# Patient Record
Sex: FEMALE | Race: WHITE | NOT HISPANIC OR LATINO | Employment: OTHER | URBAN - METROPOLITAN AREA
[De-identification: names, ages, dates, MRNs, and addresses within clinical notes are randomized per-mention and may not be internally consistent; named-entity substitution may affect disease eponyms.]

---

## 2017-04-04 ENCOUNTER — COMMUNICATION - HEALTHEAST (OUTPATIENT)
Dept: FAMILY MEDICINE | Facility: CLINIC | Age: 62
End: 2017-04-04

## 2017-04-04 DIAGNOSIS — G47.00 INSOMNIA: ICD-10-CM

## 2017-04-04 DIAGNOSIS — G43.909 MIGRAINE: ICD-10-CM

## 2017-04-06 ENCOUNTER — AMBULATORY - HEALTHEAST (OUTPATIENT)
Dept: FAMILY MEDICINE | Facility: CLINIC | Age: 62
End: 2017-04-06

## 2017-04-06 DIAGNOSIS — G47.00 INSOMNIA: ICD-10-CM

## 2017-04-22 ENCOUNTER — COMMUNICATION - HEALTHEAST (OUTPATIENT)
Dept: FAMILY MEDICINE | Facility: CLINIC | Age: 62
End: 2017-04-22

## 2017-04-22 DIAGNOSIS — G47.00 INSOMNIA: ICD-10-CM

## 2017-06-07 ENCOUNTER — HOSPITAL ENCOUNTER (OUTPATIENT)
Dept: MAMMOGRAPHY | Facility: HOSPITAL | Age: 62
Discharge: HOME | End: 2017-06-07
Attending: FAMILY MEDICINE

## 2017-06-07 DIAGNOSIS — Z12.31 VISIT FOR SCREENING MAMMOGRAM: ICD-10-CM

## 2017-06-29 ENCOUNTER — COMMUNICATION - HEALTHEAST (OUTPATIENT)
Dept: FAMILY MEDICINE | Facility: CLINIC | Age: 62
End: 2017-06-29

## 2017-06-30 ENCOUNTER — AMBULATORY - HEALTHEAST (OUTPATIENT)
Dept: FAMILY MEDICINE | Facility: CLINIC | Age: 62
End: 2017-06-30

## 2017-07-06 ENCOUNTER — COMMUNICATION - HEALTHEAST (OUTPATIENT)
Dept: SCHEDULING | Facility: CLINIC | Age: 62
End: 2017-07-06

## 2017-07-13 ENCOUNTER — AMBULATORY - HEALTHEAST (OUTPATIENT)
Dept: FAMILY MEDICINE | Facility: CLINIC | Age: 62
End: 2017-07-13

## 2017-07-17 ENCOUNTER — OFFICE VISIT - HEALTHEAST (OUTPATIENT)
Dept: FAMILY MEDICINE | Facility: CLINIC | Age: 62
End: 2017-07-17

## 2017-07-17 DIAGNOSIS — R07.9 CHEST PAIN: ICD-10-CM

## 2017-07-17 DIAGNOSIS — Z71.84 TRAVEL ADVICE ENCOUNTER: ICD-10-CM

## 2017-07-17 DIAGNOSIS — Z12.4 SCREENING FOR CERVICAL CANCER: ICD-10-CM

## 2017-07-17 DIAGNOSIS — R05.9 COUGH: ICD-10-CM

## 2017-07-17 ASSESSMENT — MIFFLIN-ST. JEOR: SCORE: 1392.96

## 2017-07-20 ENCOUNTER — COMMUNICATION - HEALTHEAST (OUTPATIENT)
Dept: FAMILY MEDICINE | Facility: CLINIC | Age: 62
End: 2017-07-20

## 2017-07-20 LAB
HPV INTERPRETATION - HISTORICAL: NORMAL
HPV INTERPRETER - HISTORICAL: NORMAL

## 2017-09-21 ENCOUNTER — COMMUNICATION - HEALTHEAST (OUTPATIENT)
Dept: FAMILY MEDICINE | Facility: CLINIC | Age: 62
End: 2017-09-21

## 2017-12-03 ENCOUNTER — COMMUNICATION - HEALTHEAST (OUTPATIENT)
Dept: FAMILY MEDICINE | Facility: CLINIC | Age: 62
End: 2017-12-03

## 2017-12-04 ENCOUNTER — COMMUNICATION - HEALTHEAST (OUTPATIENT)
Dept: FAMILY MEDICINE | Facility: CLINIC | Age: 62
End: 2017-12-04

## 2017-12-06 ENCOUNTER — COMMUNICATION - HEALTHEAST (OUTPATIENT)
Dept: FAMILY MEDICINE | Facility: CLINIC | Age: 62
End: 2017-12-06

## 2018-03-02 ENCOUNTER — COMMUNICATION - HEALTHEAST (OUTPATIENT)
Dept: SCHEDULING | Facility: CLINIC | Age: 63
End: 2018-03-02

## 2018-03-03 ENCOUNTER — COMMUNICATION - HEALTHEAST (OUTPATIENT)
Dept: FAMILY MEDICINE | Facility: CLINIC | Age: 63
End: 2018-03-03

## 2018-03-03 DIAGNOSIS — G43.909 MIGRAINE HEADACHE: ICD-10-CM

## 2018-06-27 ENCOUNTER — COMMUNICATION - HEALTHEAST (OUTPATIENT)
Dept: FAMILY MEDICINE | Facility: CLINIC | Age: 63
End: 2018-06-27

## 2018-06-27 DIAGNOSIS — G47.00 INSOMNIA: ICD-10-CM

## 2018-08-09 ENCOUNTER — COMMUNICATION - HEALTHEAST (OUTPATIENT)
Dept: FAMILY MEDICINE | Facility: CLINIC | Age: 63
End: 2018-08-09

## 2018-08-09 DIAGNOSIS — G43.909 MIGRAINE HEADACHE: ICD-10-CM

## 2018-08-17 ENCOUNTER — HOSPITAL ENCOUNTER (OUTPATIENT)
Dept: MAMMOGRAPHY | Facility: CLINIC | Age: 63
Discharge: HOME | End: 2018-08-17
Attending: FAMILY MEDICINE

## 2018-08-17 DIAGNOSIS — Z12.31 VISIT FOR SCREENING MAMMOGRAM: ICD-10-CM

## 2018-09-17 ENCOUNTER — OFFICE VISIT - HEALTHEAST (OUTPATIENT)
Dept: FAMILY MEDICINE | Facility: CLINIC | Age: 63
End: 2018-09-17

## 2018-09-17 DIAGNOSIS — G47.00 INSOMNIA: ICD-10-CM

## 2018-09-17 DIAGNOSIS — G43.909 MIGRAINE HEADACHE: ICD-10-CM

## 2018-09-17 DIAGNOSIS — M75.42 IMPINGEMENT SYNDROME, SHOULDER, LEFT: ICD-10-CM

## 2018-09-17 ASSESSMENT — MIFFLIN-ST. JEOR: SCORE: 1324.92

## 2018-11-30 ENCOUNTER — COMMUNICATION - HEALTHEAST (OUTPATIENT)
Dept: FAMILY MEDICINE | Facility: CLINIC | Age: 63
End: 2018-11-30

## 2018-12-04 ENCOUNTER — AMBULATORY - HEALTHEAST (OUTPATIENT)
Dept: NURSING | Facility: CLINIC | Age: 63
End: 2018-12-04

## 2019-02-05 ENCOUNTER — AMBULATORY - HEALTHEAST (OUTPATIENT)
Dept: NURSING | Facility: CLINIC | Age: 64
End: 2019-02-05

## 2019-02-05 ENCOUNTER — COMMUNICATION - HEALTHEAST (OUTPATIENT)
Dept: INTERNAL MEDICINE | Facility: CLINIC | Age: 64
End: 2019-02-05

## 2019-02-05 DIAGNOSIS — Z23 NEED FOR SHINGLES VACCINE: ICD-10-CM

## 2019-03-20 ENCOUNTER — OFFICE VISIT - HEALTHEAST (OUTPATIENT)
Dept: FAMILY MEDICINE | Facility: CLINIC | Age: 64
End: 2019-03-20

## 2019-03-20 DIAGNOSIS — M75.42 IMPINGEMENT SYNDROME, SHOULDER, LEFT: ICD-10-CM

## 2019-03-20 DIAGNOSIS — R41.3 MEMORY CHANGES: ICD-10-CM

## 2019-03-20 DIAGNOSIS — R19.5 LOOSE STOOLS: ICD-10-CM

## 2019-03-20 DIAGNOSIS — G47.00 INSOMNIA, UNSPECIFIED TYPE: ICD-10-CM

## 2019-03-20 DIAGNOSIS — G43.909 MIGRAINE WITHOUT STATUS MIGRAINOSUS, NOT INTRACTABLE, UNSPECIFIED MIGRAINE TYPE: ICD-10-CM

## 2019-03-20 ASSESSMENT — MIFFLIN-ST. JEOR: SCORE: 1374.82

## 2019-03-31 ENCOUNTER — COMMUNICATION - HEALTHEAST (OUTPATIENT)
Dept: FAMILY MEDICINE | Facility: CLINIC | Age: 64
End: 2019-03-31

## 2019-03-31 DIAGNOSIS — M25.519 SHOULDER PAIN, UNSPECIFIED CHRONICITY, UNSPECIFIED LATERALITY: ICD-10-CM

## 2019-04-01 ENCOUNTER — COMMUNICATION - HEALTHEAST (OUTPATIENT)
Dept: FAMILY MEDICINE | Facility: CLINIC | Age: 64
End: 2019-04-01

## 2019-04-08 ENCOUNTER — COMMUNICATION - HEALTHEAST (OUTPATIENT)
Dept: FAMILY MEDICINE | Facility: CLINIC | Age: 64
End: 2019-04-08

## 2019-04-15 ENCOUNTER — COMMUNICATION - HEALTHEAST (OUTPATIENT)
Dept: FAMILY MEDICINE | Facility: CLINIC | Age: 64
End: 2019-04-15

## 2019-04-24 ENCOUNTER — COMMUNICATION - HEALTHEAST (OUTPATIENT)
Dept: FAMILY MEDICINE | Facility: CLINIC | Age: 64
End: 2019-04-24

## 2019-05-28 ENCOUNTER — RECORDS - HEALTHEAST (OUTPATIENT)
Dept: GENERAL RADIOLOGY | Facility: CLINIC | Age: 64
End: 2019-05-28

## 2019-05-28 ENCOUNTER — OFFICE VISIT - HEALTHEAST (OUTPATIENT)
Dept: FAMILY MEDICINE | Facility: CLINIC | Age: 64
End: 2019-05-28

## 2019-05-28 DIAGNOSIS — M25.551 HIP PAIN, RIGHT: ICD-10-CM

## 2019-05-28 DIAGNOSIS — M25.551 PAIN IN RIGHT HIP: ICD-10-CM

## 2019-05-28 ASSESSMENT — MIFFLIN-ST. JEOR: SCORE: 1400.22

## 2019-06-14 ENCOUNTER — COMMUNICATION - HEALTHEAST (OUTPATIENT)
Dept: FAMILY MEDICINE | Facility: CLINIC | Age: 64
End: 2019-06-14

## 2019-06-14 DIAGNOSIS — G43.909 MIGRAINE HEADACHE: ICD-10-CM

## 2019-09-04 ENCOUNTER — COMMUNICATION - HEALTHEAST (OUTPATIENT)
Dept: SCHEDULING | Facility: CLINIC | Age: 64
End: 2019-09-04

## 2019-09-04 ENCOUNTER — OFFICE VISIT - HEALTHEAST (OUTPATIENT)
Dept: FAMILY MEDICINE | Facility: CLINIC | Age: 64
End: 2019-09-04
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
## 2019-09-14 ENCOUNTER — OFFICE VISIT - HEALTHEAST (OUTPATIENT)
Dept: FAMILY MEDICINE | Facility: CLINIC | Age: 64
End: 2019-09-14

## 2019-09-14 DIAGNOSIS — L29.9 ITCHING: ICD-10-CM

## 2019-09-14 DIAGNOSIS — L03.115 CELLULITIS OF RIGHT LOWER LEG: ICD-10-CM

## 2019-09-30 ENCOUNTER — COMMUNICATION - HEALTHEAST (OUTPATIENT)
Dept: SCHEDULING | Facility: CLINIC | Age: 64
End: 2019-09-30

## 2019-09-30 ENCOUNTER — OFFICE VISIT - HEALTHEAST (OUTPATIENT)
Dept: FAMILY MEDICINE | Facility: CLINIC | Age: 64
End: 2019-09-30

## 2019-09-30 DIAGNOSIS — L50.9 HIVES: ICD-10-CM

## 2019-09-30 ASSESSMENT — MIFFLIN-ST. JEOR: SCORE: 1374.82

## 2019-10-01 ENCOUNTER — COMMUNICATION - HEALTHEAST (OUTPATIENT)
Dept: SCHEDULING | Facility: CLINIC | Age: 64
End: 2019-10-01

## 2019-10-01 ENCOUNTER — RECORDS - HEALTHEAST (OUTPATIENT)
Dept: ADMINISTRATIVE | Facility: OTHER | Age: 64
End: 2019-10-01

## 2019-10-01 DIAGNOSIS — L50.9 HIVES: ICD-10-CM

## 2019-11-17 ENCOUNTER — COMMUNICATION - HEALTHEAST (OUTPATIENT)
Dept: FAMILY MEDICINE | Facility: CLINIC | Age: 64
End: 2019-11-17

## 2019-11-17 DIAGNOSIS — G43.909 MIGRAINE HEADACHE: ICD-10-CM

## 2019-11-18 ENCOUNTER — COMMUNICATION - HEALTHEAST (OUTPATIENT)
Dept: FAMILY MEDICINE | Facility: CLINIC | Age: 64
End: 2019-11-18

## 2020-01-20 ENCOUNTER — COMMUNICATION - HEALTHEAST (OUTPATIENT)
Dept: SCHEDULING | Facility: CLINIC | Age: 65
End: 2020-01-20

## 2020-03-04 ENCOUNTER — OFFICE VISIT - HEALTHEAST (OUTPATIENT)
Dept: FAMILY MEDICINE | Facility: CLINIC | Age: 65
End: 2020-03-04

## 2020-03-04 DIAGNOSIS — M16.11 PRIMARY OSTEOARTHRITIS OF RIGHT HIP: ICD-10-CM

## 2020-03-04 DIAGNOSIS — G47.00 INSOMNIA, UNSPECIFIED TYPE: ICD-10-CM

## 2020-03-04 DIAGNOSIS — Z01.818 PREOP GENERAL PHYSICAL EXAM: ICD-10-CM

## 2020-03-04 DIAGNOSIS — G43.909 MIGRAINE WITHOUT STATUS MIGRAINOSUS, NOT INTRACTABLE, UNSPECIFIED MIGRAINE TYPE: ICD-10-CM

## 2020-03-04 LAB
ERYTHROCYTE [DISTWIDTH] IN BLOOD BY AUTOMATED COUNT: 11.8 % (ref 11–14.5)
HCT VFR BLD AUTO: 41.7 % (ref 35–47)
HGB BLD-MCNC: 14 G/DL (ref 12–16)
MCH RBC QN AUTO: 31.3 PG (ref 27–34)
MCHC RBC AUTO-ENTMCNC: 33.6 G/DL (ref 32–36)
MCV RBC AUTO: 93 FL (ref 80–100)
PLATELET # BLD AUTO: 224 THOU/UL (ref 140–440)
PMV BLD AUTO: 8.6 FL (ref 7–10)
RBC # BLD AUTO: 4.48 MILL/UL (ref 3.8–5.4)
WBC: 6.8 THOU/UL (ref 4–11)

## 2020-03-04 RX ORDER — MAGNESIUM 30 MG
30 TABLET ORAL 2 TIMES DAILY
Status: SHIPPED | COMMUNITY
Start: 2020-03-04

## 2020-03-04 ASSESSMENT — MIFFLIN-ST. JEOR: SCORE: 1406.57

## 2020-03-06 LAB
ATRIAL RATE - MUSE: 74 BPM
DIASTOLIC BLOOD PRESSURE - MUSE: NORMAL
INTERPRETATION ECG - MUSE: NORMAL
P AXIS - MUSE: 65 DEGREES
PR INTERVAL - MUSE: 144 MS
QRS DURATION - MUSE: 88 MS
QT - MUSE: 410 MS
QTC - MUSE: 455 MS
R AXIS - MUSE: -1 DEGREES
SYSTOLIC BLOOD PRESSURE - MUSE: NORMAL
T AXIS - MUSE: 36 DEGREES
VENTRICULAR RATE- MUSE: 74 BPM

## 2020-03-10 ENCOUNTER — OFFICE VISIT - HEALTHEAST (OUTPATIENT)
Dept: FAMILY MEDICINE | Facility: CLINIC | Age: 65
End: 2020-03-10

## 2020-03-10 DIAGNOSIS — L03.011 PARONYCHIA OF FINGER OF RIGHT HAND: ICD-10-CM

## 2020-03-12 ENCOUNTER — COMMUNICATION - HEALTHEAST (OUTPATIENT)
Dept: FAMILY MEDICINE | Facility: CLINIC | Age: 65
End: 2020-03-12

## 2020-03-12 ENCOUNTER — COMMUNICATION - HEALTHEAST (OUTPATIENT)
Dept: SCHEDULING | Facility: CLINIC | Age: 65
End: 2020-03-12

## 2020-03-18 ENCOUNTER — COMMUNICATION - HEALTHEAST (OUTPATIENT)
Dept: FAMILY MEDICINE | Facility: CLINIC | Age: 65
End: 2020-03-18

## 2020-03-23 ENCOUNTER — RECORDS - HEALTHEAST (OUTPATIENT)
Dept: ADMINISTRATIVE | Facility: OTHER | Age: 65
End: 2020-03-23

## 2020-03-27 ENCOUNTER — COMMUNICATION - HEALTHEAST (OUTPATIENT)
Dept: FAMILY MEDICINE | Facility: CLINIC | Age: 65
End: 2020-03-27

## 2020-03-30 ENCOUNTER — COMMUNICATION - HEALTHEAST (OUTPATIENT)
Dept: FAMILY MEDICINE | Facility: CLINIC | Age: 65
End: 2020-03-30

## 2020-04-08 ENCOUNTER — COMMUNICATION - HEALTHEAST (OUTPATIENT)
Dept: FAMILY MEDICINE | Facility: CLINIC | Age: 65
End: 2020-04-08

## 2020-05-18 ENCOUNTER — RECORDS - HEALTHEAST (OUTPATIENT)
Dept: ADMINISTRATIVE | Facility: OTHER | Age: 65
End: 2020-05-18

## 2020-06-11 ENCOUNTER — RECORDS - HEALTHEAST (OUTPATIENT)
Dept: ADMINISTRATIVE | Facility: OTHER | Age: 65
End: 2020-06-11

## 2020-07-23 ENCOUNTER — COMMUNICATION - HEALTHEAST (OUTPATIENT)
Dept: FAMILY MEDICINE | Facility: CLINIC | Age: 65
End: 2020-07-23

## 2020-07-24 ENCOUNTER — OFFICE VISIT - HEALTHEAST (OUTPATIENT)
Dept: FAMILY MEDICINE | Facility: CLINIC | Age: 65
End: 2020-07-24

## 2020-07-24 ENCOUNTER — COMMUNICATION - HEALTHEAST (OUTPATIENT)
Dept: SCHEDULING | Facility: CLINIC | Age: 65
End: 2020-07-24

## 2020-07-24 DIAGNOSIS — N30.00 ACUTE CYSTITIS WITHOUT HEMATURIA: ICD-10-CM

## 2020-07-24 DIAGNOSIS — R39.15 URINARY URGENCY: ICD-10-CM

## 2020-07-24 DIAGNOSIS — R10.32 ABDOMINAL PAIN, LEFT LOWER QUADRANT: ICD-10-CM

## 2020-07-24 LAB
ALBUMIN SERPL-MCNC: 3.5 G/DL (ref 3.5–5)
ALBUMIN UR-MCNC: NEGATIVE MG/DL
ALP SERPL-CCNC: 85 U/L (ref 45–120)
ALT SERPL W P-5'-P-CCNC: 25 U/L (ref 0–45)
ANION GAP SERPL CALCULATED.3IONS-SCNC: 7 MMOL/L (ref 5–18)
APPEARANCE UR: CLEAR
AST SERPL W P-5'-P-CCNC: 20 U/L (ref 0–40)
BACTERIA #/AREA URNS HPF: ABNORMAL HPF
BILIRUB SERPL-MCNC: 0.3 MG/DL (ref 0–1)
BILIRUB UR QL STRIP: NEGATIVE
BUN SERPL-MCNC: 16 MG/DL (ref 8–22)
CALCIUM SERPL-MCNC: 9.2 MG/DL (ref 8.5–10.5)
CHLORIDE BLD-SCNC: 107 MMOL/L (ref 98–107)
CO2 SERPL-SCNC: 28 MMOL/L (ref 22–31)
COLOR UR AUTO: YELLOW
CREAT SERPL-MCNC: 0.77 MG/DL (ref 0.6–1.1)
ERYTHROCYTE [DISTWIDTH] IN BLOOD BY AUTOMATED COUNT: 12.7 % (ref 11–14.5)
GFR SERPL CREATININE-BSD FRML MDRD: >60 ML/MIN/1.73M2
GLUCOSE BLD-MCNC: 84 MG/DL (ref 70–125)
GLUCOSE UR STRIP-MCNC: NEGATIVE MG/DL
HCT VFR BLD AUTO: 43.5 % (ref 35–47)
HGB BLD-MCNC: 13.6 G/DL (ref 12–16)
HGB UR QL STRIP: NEGATIVE
KETONES UR STRIP-MCNC: NEGATIVE MG/DL
LEUKOCYTE ESTERASE UR QL STRIP: ABNORMAL
MCH RBC QN AUTO: 30.4 PG (ref 27–34)
MCHC RBC AUTO-ENTMCNC: 31.3 G/DL (ref 32–36)
MCV RBC AUTO: 97 FL (ref 80–100)
MUCOUS THREADS #/AREA URNS LPF: ABNORMAL LPF
NITRATE UR QL: POSITIVE
PH UR STRIP: 5.5 [PH] (ref 5–8)
PLATELET # BLD AUTO: 194 THOU/UL (ref 140–440)
PMV BLD AUTO: 12.4 FL (ref 8.5–12.5)
POTASSIUM BLD-SCNC: 4.9 MMOL/L (ref 3.5–5)
PROT SERPL-MCNC: 5.9 G/DL (ref 6–8)
RBC # BLD AUTO: 4.48 MILL/UL (ref 3.8–5.4)
RBC #/AREA URNS AUTO: ABNORMAL HPF
SODIUM SERPL-SCNC: 142 MMOL/L (ref 136–145)
SP GR UR STRIP: 1.02 (ref 1–1.03)
SQUAMOUS #/AREA URNS AUTO: ABNORMAL LPF
UROBILINOGEN UR STRIP-ACNC: ABNORMAL
WBC #/AREA URNS AUTO: ABNORMAL HPF
WBC: 5.9 THOU/UL (ref 4–11)

## 2020-07-25 ENCOUNTER — COMMUNICATION - HEALTHEAST (OUTPATIENT)
Dept: FAMILY MEDICINE | Facility: CLINIC | Age: 65
End: 2020-07-25

## 2020-07-27 LAB — BACTERIA SPEC CULT: ABNORMAL

## 2020-09-06 ENCOUNTER — COMMUNICATION - HEALTHEAST (OUTPATIENT)
Dept: FAMILY MEDICINE | Facility: CLINIC | Age: 65
End: 2020-09-06

## 2020-09-06 DIAGNOSIS — N30.00 ACUTE CYSTITIS WITHOUT HEMATURIA: ICD-10-CM

## 2020-09-08 ENCOUNTER — AMBULATORY - HEALTHEAST (OUTPATIENT)
Dept: LAB | Facility: CLINIC | Age: 65
End: 2020-09-08

## 2020-09-08 ENCOUNTER — AMBULATORY - HEALTHEAST (OUTPATIENT)
Dept: FAMILY MEDICINE | Facility: CLINIC | Age: 65
End: 2020-09-08

## 2020-09-08 ENCOUNTER — COMMUNICATION - HEALTHEAST (OUTPATIENT)
Dept: FAMILY MEDICINE | Facility: CLINIC | Age: 65
End: 2020-09-08

## 2020-09-08 DIAGNOSIS — N30.00 ACUTE CYSTITIS WITHOUT HEMATURIA: ICD-10-CM

## 2020-09-08 LAB
ALBUMIN UR-MCNC: NEGATIVE MG/DL
APPEARANCE UR: ABNORMAL
BACTERIA #/AREA URNS HPF: ABNORMAL HPF
BILIRUB UR QL STRIP: NEGATIVE
COLOR UR AUTO: YELLOW
GLUCOSE UR STRIP-MCNC: NEGATIVE MG/DL
HGB UR QL STRIP: ABNORMAL
KETONES UR STRIP-MCNC: NEGATIVE MG/DL
LEUKOCYTE ESTERASE UR QL STRIP: ABNORMAL
MUCOUS THREADS #/AREA URNS LPF: ABNORMAL LPF
NITRATE UR QL: POSITIVE
PH UR STRIP: 5.5 [PH] (ref 5–8)
RBC #/AREA URNS AUTO: ABNORMAL HPF
SP GR UR STRIP: 1.01 (ref 1–1.03)
SQUAMOUS #/AREA URNS AUTO: ABNORMAL LPF
UROBILINOGEN UR STRIP-ACNC: ABNORMAL
WBC #/AREA URNS AUTO: ABNORMAL HPF
WBC CLUMPS #/AREA URNS HPF: PRESENT /[HPF]

## 2020-09-10 LAB — BACTERIA SPEC CULT: ABNORMAL

## 2020-11-27 ENCOUNTER — HOSPITAL ENCOUNTER (OUTPATIENT)
Dept: MAMMOGRAPHY | Facility: CLINIC | Age: 65
Discharge: HOME | End: 2020-11-27
Attending: FAMILY MEDICINE

## 2020-11-27 DIAGNOSIS — Z12.31 VISIT FOR SCREENING MAMMOGRAM: ICD-10-CM

## 2020-12-02 ENCOUNTER — COMMUNICATION - HEALTHEAST (OUTPATIENT)
Dept: FAMILY MEDICINE | Facility: CLINIC | Age: 65
End: 2020-12-02

## 2020-12-02 DIAGNOSIS — G43.909 MIGRAINE HEADACHE: ICD-10-CM

## 2021-01-02 ENCOUNTER — COMMUNICATION - HEALTHEAST (OUTPATIENT)
Dept: SCHEDULING | Facility: CLINIC | Age: 66
End: 2021-01-02

## 2021-01-04 ENCOUNTER — RECORDS - HEALTHEAST (OUTPATIENT)
Dept: GENERAL RADIOLOGY | Facility: CLINIC | Age: 66
End: 2021-01-04

## 2021-01-04 ENCOUNTER — OFFICE VISIT - HEALTHEAST (OUTPATIENT)
Dept: FAMILY MEDICINE | Facility: CLINIC | Age: 66
End: 2021-01-04

## 2021-01-04 DIAGNOSIS — M54.50 LOW BACK PAIN: ICD-10-CM

## 2021-01-04 DIAGNOSIS — M54.50 ACUTE MIDLINE LOW BACK PAIN WITHOUT SCIATICA: ICD-10-CM

## 2021-01-04 RX ORDER — CYCLOBENZAPRINE HCL 5 MG
5-10 TABLET ORAL 3 TIMES DAILY PRN
Qty: 30 TABLET | Refills: 0 | Status: SHIPPED | OUTPATIENT
Start: 2021-01-04 | End: 2022-04-05

## 2021-01-04 ASSESSMENT — MIFFLIN-ST. JEOR: SCORE: 1442.86

## 2021-02-24 ENCOUNTER — COMMUNICATION - HEALTHEAST (OUTPATIENT)
Dept: FAMILY MEDICINE | Facility: CLINIC | Age: 66
End: 2021-02-24

## 2021-03-01 ENCOUNTER — COMMUNICATION - HEALTHEAST (OUTPATIENT)
Dept: FAMILY MEDICINE | Facility: CLINIC | Age: 66
End: 2021-03-01

## 2021-03-02 ENCOUNTER — COMMUNICATION - HEALTHEAST (OUTPATIENT)
Dept: FAMILY MEDICINE | Facility: CLINIC | Age: 66
End: 2021-03-02

## 2021-03-03 ENCOUNTER — OFFICE VISIT - HEALTHEAST (OUTPATIENT)
Dept: FAMILY MEDICINE | Facility: CLINIC | Age: 66
End: 2021-03-03

## 2021-03-03 ENCOUNTER — COMMUNICATION - HEALTHEAST (OUTPATIENT)
Dept: FAMILY MEDICINE | Facility: CLINIC | Age: 66
End: 2021-03-03

## 2021-03-03 DIAGNOSIS — G43.909 MIGRAINE WITHOUT STATUS MIGRAINOSUS, NOT INTRACTABLE, UNSPECIFIED MIGRAINE TYPE: ICD-10-CM

## 2021-04-12 ENCOUNTER — COMMUNICATION - HEALTHEAST (OUTPATIENT)
Dept: FAMILY MEDICINE | Facility: CLINIC | Age: 66
End: 2021-04-12

## 2021-04-12 DIAGNOSIS — G43.909 MIGRAINE HEADACHE: ICD-10-CM

## 2021-04-12 RX ORDER — ZOLMITRIPTAN 5 MG/1
TABLET, FILM COATED ORAL
Qty: 12 TABLET | Refills: 5 | Status: SHIPPED | OUTPATIENT
Start: 2021-04-12 | End: 2021-12-21

## 2021-05-27 NOTE — TELEPHONE ENCOUNTER
Who is calling:  Myron barnes [facility]    Reason for Call:  Wanting referral faxed over to [phone]. Inform Myron that it states the order was faxed on 04.09.19 and he stated that they did not receive it and if it can be re- faxed. Please add Attn : Referrals on order when faxing.   Date of last appointment with primary care: 03.20.19  Okay to leave a detailed message: Yes

## 2021-05-27 NOTE — PROGRESS NOTES
Assessment/Plan:     1. Loose stools  No red symptoms identified.  We discussed options.  Because her symptoms are rather mild and started with it.  Of stress and dietary changes, will have her start by initiating a probiotic.  Encouraged her to add Metamucil as well, increase dietary fiber, reduced.  Sugars.  Notify with persistence, worsening, or onset of additional symptoms.    2. Impingement syndrome, shoulder, left  Reviewed nature of condition.  Encouraged use of NSAIDs regularly, ice, range of motion x-ray avoidance of excessive activity or weightbearing above shoulder height, and consideration of seeing orthopedic specialist versus physical therapy.  She is leaning toward her orthopedic specialist.  Information provided.  Notify with ongoing for additional difficulties.    3. Migraine without status migrainosus, not intractable, unspecified migraine type  She is doing well off amitriptyline, though this may be contributing to her recent worsening of sleep.  We will keep an eye on migraine pattern.    4. Insomnia, unspecified type  Encourage efforts at good sleep hygiene.  May need to consider resumption of amitriptyline.  Discussed melatonin for sleep cycle adjustment.  Encouraged consideration of a therapist due to recent stressors.  Notify with persistence or worsening.    5. Memory changes  No red flag symptoms.  Encouraged her to discuss this with her family and close friends to see if there are no recent changes as well.  She denies anxiety depression symptoms contributing significantly.  Encourage follow-up visit with ongoing concerns.      Patient Instructions   For your arm, begin either ibuprofen 600 mg 3 times daily with food or naproxen 500 mg twice daily with food, ice 3 times daily, and consider appointment with orthopedic surgery or physical therapy.    For your change in bowels, continue your probiotic, add Metamucil, and work to increase her intake of dietary fiber as well reducing dairy  and sugars.  Notify me if lack of improvement or worsening.    Consider restarting amitriptyline to help with your sleep.  You can take it as needed or on a regular basis.  Work to make her sleep environment more comfortable so that he can sleep more soundly.  Consider seeing a therapist to work through some of the anxiety that is creating distraction for you.       Return in about 3 months (around 6/20/2019) for Annual physical.      Subjective:      Kaitlynn Ramirez is a 63 y.o. female presented to clinic today with multiple concerns.  First she is noticing a change in bowel movements.  Left lower sed rate predictable bowel movements once daily.  She traveled in January, and since then has had stools are looser than usual occurring anywhere from 3-8 times per day.  No mucus or blood.  No fevers or chills.  No abdominal pain.  No dietary changes but notes that her diet in general has been very poor.  She has not tried any interventions.    Noting ongoing difficulties with left arm pain.  I saw her in September and diagnosed left shoulder impingement, recommended NSAIDs, ice, and initially she had some benefit but not resolved and now seems to be getting worse.  Is radiating into her upper arm and sometimes is disruptive of sleep causing her need to change position.  She had not pursue physical therapy.  She is no longer using ice to the NSAIDs.  No weakness.    Previously taking amitriptyline regularly at bedtime for primarily migraine prevention.  She discontinued this with her travel in January.  Notes her migraines have been better but then just recently her basement flooded, she had worsened sleep, and she is noting a bump in headaches and feeling a bit more lima.  States that she is not sleeping well which she attributes to sleeping in a different floor that is much more warm than her usual pattern due to recent clotting.  Using Advil PM as needed.    Concerns regarding memory and fear of Alzheimer's.   "No family history of Alzheimer's dementia.  Notes that she occasionally is forgetting words.  Sometimes will make \"silly mistakes\" at work.  She is not having difficulties with following recipes, finances, navigation, etc. and has not had any friends or family members mention concerns to her.  She has not asked them.    Current Outpatient Medications   Medication Sig Dispense Refill     calcium polycarbophil (FIBERCON) 625 mg tablet Take 1,250 mg by mouth daily.       cholecalciferol, vitamin D3, (VITAMIN D3) 1,000 unit capsule Take 1,000 Units by mouth daily.       LACTOBACILLUS ACIDOPHILUS (PROBIOTIC ORAL) Take 1 capsule by mouth daily.        multivitamin therapeutic (THERAGRAN) tablet Take 1 tablet by mouth daily.       omeprazole (PRILOSEC) 20 MG capsule Take 1 capsule (20 mg total) by mouth daily before breakfast. 30 capsule 0     ZOLMitriptan (ZOMIG) 5 MG tablet TAKE ONE-HALF (1/2) TO ONE FULL TABLET AS NEEDED FOR MIGRAINE. Maximum 2 doses in 24 hours. 12 tablet 6     zolpidem (AMBIEN) 5 MG tablet Take 5-10 mg by mouth at bedtime as needed for sleep.       No current facility-administered medications for this visit.        Past Medical History, Family History, and Social History reviewed.  Past Medical History:   Diagnosis Date     Migraine      Past Surgical History:   Procedure Laterality Date     FOOT SURGERY Left 1994     RETINAL DETACHMENT SURGERY Left 11/2015     Patient has no known allergies.  Family History   Problem Relation Age of Onset     Kidney cancer Mother      Basal cell carcinoma Mother      No Medical Problems Sister      Basal cell carcinoma Brother      No Medical Problems Brother      No Medical Problems Brother      Breast cancer Neg Hx      Colon cancer Neg Hx      Prostate cancer Neg Hx      Ovarian cancer Neg Hx      Social History     Socioeconomic History     Marital status:      Spouse name: Not on file     Number of children: Not on file     Years of education: Not on file " "    Highest education level: Not on file   Occupational History     Not on file   Social Needs     Financial resource strain: Not on file     Food insecurity:     Worry: Not on file     Inability: Not on file     Transportation needs:     Medical: Not on file     Non-medical: Not on file   Tobacco Use     Smoking status: Never Smoker     Smokeless tobacco: Never Used   Substance and Sexual Activity     Alcohol use: No     Comment: social and infrequent     Drug use: No     Sexual activity: Not on file   Lifestyle     Physical activity:     Days per week: Not on file     Minutes per session: Not on file     Stress: Not on file   Relationships     Social connections:     Talks on phone: Not on file     Gets together: Not on file     Attends [employer] service: Not on file     Active member of club or organization: Not on file     Attends meetings of clubs or organizations: Not on file     Relationship status: Not on file     Intimate partner violence:     Fear of current or ex partner: Not on file     Emotionally abused: Not on file     Physically abused: Not on file     Forced sexual activity: Not on file   Other Topics Concern     Not on file   Social History Narrative     Not on file         Review of systems is as stated in HPI, and the remainder of the 10 system review is otherwise negative.    Objective:     Vitals:    03/20/19 1547   BP: 112/78   Pulse: 74   Resp: 20   Temp: 98.3  F (36.8  C)   TempSrc: Oral   SpO2: 94%   Weight: 174 lb (78.9 kg)   Height: 5' 7.5\" (1.715 m)    Body mass index is 26.85 kg/m .    Alert female.  Anxious.  Mucous membranes moist.  Neck supple without lymphadenopathy or thyromegaly.  She has good range of motion of both shoulders.  Tenderness is elicited with resisted abduction and external rotation with good strength throughout.  No focal tenderness to palpation throughout her shoulder and neck.  Heart regular rate and rhythm.  Lungs clear.  Extremities without edema.  She has no " difficulty with our conversation today and her answers are appropriate.      This note has been dictated using voice recognition software. Any grammatical or context distortions are unintentional and inherent to the the software.

## 2021-05-28 NOTE — TELEPHONE ENCOUNTER
Patient stopped into clinic stating [facility] did not receive the referral, therefore she had to cancel her appointment for earlier today. I called [facility] and spoke to their managed care rep and informed her that we sent the referral to [facility] and gave her the authorization number. She said patient is ok to schedule. Gave authorization number to patient as well and informed her to call us if there are any further issues with getting an appointment at [facility]. Gave patient my direct phone number.

## 2021-05-28 NOTE — TELEPHONE ENCOUNTER
Who is calling:  Patient  Reason for Call: Patient calling from [facility] for PT appointment.  Pt needs copy of referral & authorization faxed  ASAP or she cannot be seen today. I faxed copy to [facility] @ [phone]   .    Date of last appointment with primary care:  3/20/19  Has the patient been recently seen:  Yes  Okay to leave a detailed message: Yes

## 2021-05-28 NOTE — TELEPHONE ENCOUNTER
Who is calling:  Patient   Reason for Call:  Patient needa prior authorization for referral with [facility].  Patient is asking if this can be completed if possible by Wednesday 4/24/19 as she has an am appointment at [facility].  Date of last appointment with primary care: NA  Okay to leave a detailed message: Yes [phone]

## 2021-05-29 NOTE — PROGRESS NOTES
Assessment/Plan:    1. Hip pain, right  We discussed potential etiologies of right hip pain including osteoarthritis.  Right pelvic and hip x-ray today is negative for fractures or dislocations.  Given worsening pain over the past 3 weeks, will place referral to orthopedic surgery for further evaluation and management.  I recommend use of extra strength Tylenol or ibuprofen for pain management.  We discussed alternating application of ice and heat for pain management as well.  Patient is advised to notify us with any new or worsening symptoms in the meantime.  - XR Pelvis W 2 Vw Hip Right; Future  - Ambulatory referral to Orthopedic Surgery    Subjective:    Kaitlynn Ramirez is a 63 year old female seen today for evaluation of right hip pain.  Patient has been experiencing pain in her right groin and hip on and off for the past 5 years or so.  Over the last couple weeks it is been notably worse.  Feels that with time it is worsening and persistent.  Patient recalls discussing her right hip pain at an office visit within the last couple of years.  She was told at that time that pain was most likely related to arthritis.  Patient is now worried because pain seems to be worse than it was at that time.  She denies any new injury to the hip.  Finds that pain worsens with certain weightbearing activities such as stairs, getting in and out of a car.  She denies any increased pain with straining.  No numbness or tingling in her right leg.  Range of motion is intact but she feels that it is more stiff than usual.  She has taken over-the-counter medications with just mild relief of symptoms. No low back pain, no shooting pain.  She has felt well otherwise overall.  Reports some arthritis in her left shoulder which she has been doing physical therapy for. Review of systems is as stated in HPI, and the remainder of the 10 system review is otherwise unremarkable.    Past Medical History, Family History, and Social History  "reviewed.    Past Surgical History:   Procedure Laterality Date     FOOT SURGERY Left 1994     RETINAL DETACHMENT SURGERY Left 11/2015        Family History   Problem Relation Age of Onset     Kidney cancer Mother      Basal cell carcinoma Mother      No Medical Problems Sister      Basal cell carcinoma Brother      No Medical Problems Brother      No Medical Problems Brother      Breast cancer Neg Hx      Colon cancer Neg Hx      Prostate cancer Neg Hx      Ovarian cancer Neg Hx         Past Medical History:   Diagnosis Date     Migraine         Social History     Tobacco Use     Smoking status: Never Smoker     Smokeless tobacco: Never Used   Substance Use Topics     Alcohol use: No     Comment: social and infrequent     Drug use: No        Current Outpatient Medications   Medication Sig Dispense Refill     calcium polycarbophil (FIBERCON) 625 mg tablet Take 1,250 mg by mouth daily.       cholecalciferol, vitamin D3, (VITAMIN D3) 1,000 unit capsule Take 1,000 Units by mouth daily.       LACTOBACILLUS ACIDOPHILUS (PROBIOTIC ORAL) Take 1 capsule by mouth daily.        multivitamin therapeutic (THERAGRAN) tablet Take 1 tablet by mouth daily.       ZOLMitriptan (ZOMIG) 5 MG tablet TAKE ONE-HALF (1/2) TO ONE FULL TABLET AS NEEDED FOR MIGRAINE. Maximum 2 doses in 24 hours. 12 tablet 6     zolpidem (AMBIEN) 5 MG tablet Take 5-10 mg by mouth at bedtime as needed for sleep.       omeprazole (PRILOSEC) 20 MG capsule Take 1 capsule (20 mg total) by mouth daily before breakfast. 30 capsule 0     No current facility-administered medications for this visit.           Objective:    Vitals:    05/28/19 0928   BP: 110/62   Patient Site: Right Arm   Patient Position: Sitting   Cuff Size: Adult Regular   Pulse: 60   Weight: 179 lb 9.6 oz (81.5 kg)   Height: 5' 7.5\" (1.715 m)      Body mass index is 27.71 kg/m .      General Appearance:  Alert, cooperative, no distress, appears stated age   Lungs:   Clear to auscultation bilaterally, " respirations unlabored.   Heart:  Regular rate and rhythm, S1, S2 normal, no murmur, rub or gallop   Abdomen:   Soft, non-tender, positive bowel sounds, no masses, no organomegaly,no hernia.    Extremities:  Right hip with slightly decreased range of motion due to discomfort.  Range of motion, strength and sensation otherwise intact.  No crepitus noted.  Extremities wise normal.  No cyanosis or edema   Skin: Warm, dry.  No rashes or lesions   Neurologic:  Alert and oriented x3, grossly intact without any focal deficits noted.       This note has been dictated using voice recognition software. Any grammatical or context distortions are unintentional and inherent to the use of this software.

## 2021-05-29 NOTE — TELEPHONE ENCOUNTER
Refill Approved    Rx renewed per Medication Renewal Policy. Medication was last renewed on 9/17/2018.       Deshaun Kraft, [facility] Triage/Med Refill 6/16/2019     Requested Prescriptions   Pending Prescriptions Disp Refills     ZOLMitriptan (ZOMIG) 5 MG tablet [Pharmacy Med Name: ZOLMITRIPTAN TABS 1'S 5MG] 6 tablet 3     Sig: TAKE ONE-HALF (1/2) TABLET AS NEEDED FOR MIGRAINE       Triptans Refill Protocol Passed - 6/14/2019  6:24 AM        Passed - PCP or prescribing provider visit in past 12 months       Last office visit with prescriber/PCP: 3/20/2019 Zeinab Oneal MD OR same dept: 5/28/2019 Maria Caballero CNP OR same specialty: 5/28/2019 Maria Caballero CNP  Last physical: 10/6/2016 Last MTM visit: Visit date not found   Next visit within 3 mo: Visit date not found  Next physical within 3 mo: Visit date not found  Prescriber OR PCP: Zeinab Oneal MD  Last diagnosis associated with med order: 1. Migraine headache  - ZOLMitriptan (ZOMIG) 5 MG tablet [Pharmacy Med Name: ZOLMITRIPTAN TABS 1'S 5MG]; TAKE ONE-HALF (1/2) TABLET AS NEEDED FOR MIGRAINE  Dispense: 6 tablet; Refill: 3    If protocol passes may refill for 12 months if within 3 months of last provider visit (or a total of 15 months).

## 2021-05-31 VITALS — BODY MASS INDEX: 26.98 KG/M2 | HEIGHT: 68 IN | WEIGHT: 178 LBS

## 2021-06-01 NOTE — TELEPHONE ENCOUNTER
Per Ching yesterday pt would like to just go to derm since on prednisone for more than 24 hours and still speading.  St sourav Koroma  She takes her insurance

## 2021-06-01 NOTE — PROGRESS NOTES
Assessment/Plan:    1. Bug bite with infection, initial encounter  Insect bite located on right anterior shin with surrounding erythema, warmth and some tenderness.  For safe measures, will treat with doxycycline 100 mg twice daily for 10 days.  Patient is advised to monitor for any worsening redness, swelling, warmth or discomfort.  Bite on left side of neck is less concerning for infection.  Recommend that she monitor this.  We discussed return precautions.  - doxycycline (MONODOX) 100 MG capsule; Take 1 capsule (100 mg total) by mouth 2 (two) times a day for 10 days.  Dispense: 20 capsule; Refill: 0      Subjective:    Kaitlynn Ramirez is a 64 year old female seen today for evaluation of 2 insect bites one located on the left side of her neck and another on her right shin.  Patient woke up 2 days ago to itching on the left side of her neck.  That morning, noticed some redness and what appeared to be an insect bite.  Shortly after she noticed another insect bite on her right shin.  This 1 seems to have gotten worse over the last 2 days.  There is some redness, swelling and warmth noted.  Some discomfort when she presses on it.  She denies any drainage or bleeding.  She is unaware of any tick bites, denies noticing any type of bull's-eye rash on either bite any.  She did notice a spider in her basement but otherwise no obvious insects.  Denies living in a heavily wooded area and has not been wooded areas recently.  She is not having any systemic symptoms of fevers, chills, polyarthralgias, night sweats etc.  No diarrhea or vomiting.  Review of systems is as stated in HPI, and the remainder of the 10 system review is otherwise unremarkable.    Past Medical History, Family History, and Social History reviewed.    Past Surgical History:   Procedure Laterality Date     FOOT SURGERY Left 1994     RETINAL DETACHMENT SURGERY Left 11/2015        Family History   Problem Relation Age of Onset     Kidney cancer Mother       Basal cell carcinoma Mother      No Medical Problems Sister      Basal cell carcinoma Brother      No Medical Problems Brother      No Medical Problems Brother      Breast cancer Neg Hx      Colon cancer Neg Hx      Prostate cancer Neg Hx      Ovarian cancer Neg Hx         Past Medical History:   Diagnosis Date     Migraine         Social History     Tobacco Use     Smoking status: Never Smoker     Smokeless tobacco: Never Used   Substance Use Topics     Alcohol use: No     Comment: social and infrequent     Drug use: No        Current Outpatient Medications   Medication Sig Dispense Refill     calcium polycarbophil (FIBERCON) 625 mg tablet Take 1,250 mg by mouth daily.       cholecalciferol, vitamin D3, (VITAMIN D3) 1,000 unit capsule Take 1,000 Units by mouth daily.       LACTOBACILLUS ACIDOPHILUS (PROBIOTIC ORAL) Take 1 capsule by mouth daily.        multivitamin therapeutic (THERAGRAN) tablet Take 1 tablet by mouth daily.       ZOLMitriptan (ZOMIG) 5 MG tablet TAKE ONE-HALF (1/2) TABLET AS NEEDED FOR MIGRAINE 6 tablet 3     zolpidem (AMBIEN) 5 MG tablet Take 5-10 mg by mouth at bedtime as needed for sleep.       doxycycline (MONODOX) 100 MG capsule Take 1 capsule (100 mg total) by mouth 2 (two) times a day for 10 days. 20 capsule 0     No current facility-administered medications for this visit.           Objective:    Vitals:    09/04/19 1120   BP: 112/78   Patient Site: Left Arm   Patient Position: Sitting   Cuff Size: Adult Regular   Pulse: 79   Temp: 98.8  F (37.1  C)   SpO2: 97%   Weight: 172 lb (78 kg)      Body mass index is 26.54 kg/m .      General Appearance:  Alert, cooperative, no distress, appears stated age   Lungs:   Clear to auscultation bilaterally, respirations unlabored.  No expiratory wheeze or inspiratory crackles noted.   Heart:  Regular rate and rhythm, S1, S2 normal, no murmur, rub or gallop   Skin:  Patient has a small insect bite noted on left side of neck, mild erythema extending  around the bite about the size of a dime.  No warmth, tenderness or drainage noted.  Another insect bite noted on right anterior shin with erythema approximately 1.5 inches around, no drainage or bleeding.  Some warmth and tenderness noted with palpation.  Skin is otherwise warm, dry.           This note has been dictated using voice recognition software. Any grammatical or context distortions are unintentional and inherent to the use of this software.

## 2021-06-01 NOTE — PROGRESS NOTES
Assessment:     1. Hives  predniSONE (DELTASONE) 20 MG tablet       Plan:     Kaitlynn is a 64-year-old female presenting today with a rash and itching consistent with hives.  I suspect that she likely had a spider bite at the beginning of the month and she has had an activated immune system since that time.  I prescribed prednisone with a gradual 10-day tapering dosage.  I recommended Zyrtec 10 mg once daily.  She can take hydroxyzine as needed for breakthrough itching.  I asked her to stay in touch and let me know if the rash does not either resolve or if it returns following cessation of the prednisone course.    Subjective:       64 y.o. female presents for evaluation of a rash.  The patient was initially evaluated on 4 September and diagnosed with a bug bite.  The patient remembers awakening in the middle of the night feeling a sharp pain in her left side of her neck that felt like a bite from an insect.  She woke up with a red lump in that area but also noticed a similar red bump on her right lower leg.  The area of redness expanded over about 48-hour.  And she was evaluated and diagnosed with a possible cellulitis as well as bug bite and prescribed an antibiotic.  She reports that it did improve over the next 3 days or so but that she then developed a red rash in that same area as well as itching and was seen again and re-prescribed another antibiotic.  She comes in today stating that she has a rash all over her body that is very itchy.  She has no prior history for hives or allergic reactions to any medication.  She feels okay systemically and denies any fever or chills.      Reviewed: The following portions of the patient's history were reviewed and updated as appropriate: allergies, current medications, past family history, past medical history, past social history, past surgical history and problem list.    Review of Systems  Pertinent items are noted in HPI.        Objective:     /78 (Patient Site:  "Left Arm, Patient Position: Sitting, Cuff Size: Adult Large)   Ht 5' 7.5\" (1.715 m)   Wt 174 lb (78.9 kg)   BMI 26.85 kg/m    General appearance: alert, appears stated age and cooperative  Skin: The patient has a hive-like rash throughout her body with some predominant places being near her wrists, along her spine and back, her breasts as well as her lower abdomen.  The rashes relatively symmetric from right to left.  I would describe it as a confluent erythema with well-demarcated line between it and her normal skin.      This note has been dictated using voice recognition software. Any grammatical or context distortions are unintentional and inherent to the software  "

## 2021-06-01 NOTE — TELEPHONE ENCOUNTER
RN triage  Patient is calling to report that she noticed upon waking up   Yesterday morning a bite any on R leg bottom of her calf.   Today the area is red, hot, and skin is feeling tight. She can see a puncture any in the center. The size of the area is 1 inch  Patient reports 2 nights ago she woke up in the night itching her neck and noticed a red any that is a lump and can see a puncture in the center.  Both spots can be itchy, some pain.  Patient denies fever, denies any red streaks.  Gave disposition to be seen today. Patient was agreeable  Reviewed signs and symptoms of when to call back.  Patient was warm transferred to scheduling.   Lawanda Caruso RN  [facility] Triage Nurse  10:43 AM  9/4/2019          Reason for Disposition    Red or very tender (to touch) area, and started over 24 hours after the bite    Protocols used: INSECT BITE-A-OH

## 2021-06-01 NOTE — TELEPHONE ENCOUNTER
"Pt treated for cellulitis of R leg and neck at last OV of 9/14/19.  Treated with doxycycline which resolved symptoms.    Pt also had severe itching associated with cellulitis episode.  Rec'd Rx for hydroxyzine.    Today has widespread redness and itching.  Pt states \"All over torso (front, back), chest, breasts, neck, arms, hands, scalp.  No facial involvement.  Severely itchy -> took hydroxyzine with little benefit.    Areas of itchiness are \"patchy and red.\"  Similar to cellulitis episode of cellulitis on R leg one month ago.    Pt denies fever, however states \"feeling punky.\"  Pt agrees to clinical eval within the next four hours.  Warm transferred to a  for this purpose now.    Sonya Mallory RN BSBA  [facility] RN Triage     Reason for Disposition    MODERATE-SEVERE hives persist (i.e., hives interfere with normal activities or work) and taking antihistamine (e.g., Benadryl, Claritin) > 24 hours    Protocols used: HIVES-A-OH      "

## 2021-06-01 NOTE — TELEPHONE ENCOUNTER
"  CC:  Status update   -  \"Is it supposed to get worse before it gets better?\"    Seen by Dr Flower and started on Pred       Today - \"Hives much worse\"    > Previously not on arms --- Completely covering      > Previously not on leg -- Completely coving legs       3 doses of pred so far  (2 yesterday and 1 today)      2 doses of Zyrtec so far (last night and today)        Pt tele# [phone] cell - [facility]           A/P:   > I will message provider to updte on status  And request they call you back to discuss      Sivakumar Kraft, RN   Triage and Medication Refills    "

## 2021-06-02 VITALS — WEIGHT: 174 LBS | BODY MASS INDEX: 26.37 KG/M2 | HEIGHT: 68 IN

## 2021-06-02 VITALS — WEIGHT: 163 LBS | HEIGHT: 68 IN | BODY MASS INDEX: 24.71 KG/M2

## 2021-06-03 VITALS
HEART RATE: 84 BPM | OXYGEN SATURATION: 97 % | WEIGHT: 172 LBS | DIASTOLIC BLOOD PRESSURE: 72 MMHG | TEMPERATURE: 99.1 F | BODY MASS INDEX: 26.54 KG/M2 | RESPIRATION RATE: 16 BRPM | SYSTOLIC BLOOD PRESSURE: 108 MMHG

## 2021-06-03 VITALS — HEIGHT: 68 IN | WEIGHT: 179.6 LBS | BODY MASS INDEX: 27.22 KG/M2

## 2021-06-03 VITALS
WEIGHT: 172 LBS | SYSTOLIC BLOOD PRESSURE: 112 MMHG | OXYGEN SATURATION: 97 % | BODY MASS INDEX: 26.54 KG/M2 | DIASTOLIC BLOOD PRESSURE: 78 MMHG | HEART RATE: 79 BPM | TEMPERATURE: 98.8 F

## 2021-06-03 VITALS
HEIGHT: 68 IN | BODY MASS INDEX: 26.37 KG/M2 | DIASTOLIC BLOOD PRESSURE: 78 MMHG | SYSTOLIC BLOOD PRESSURE: 118 MMHG | WEIGHT: 174 LBS

## 2021-06-03 NOTE — TELEPHONE ENCOUNTER
Refill Approved    Rx renewed per Medication Renewal Policy. Medication was last renewed on 6/16/19.    Carol Heredia, [facility] Triage/Med Refill 11/17/2019     Requested Prescriptions   Pending Prescriptions Disp Refills     ZOLMitriptan (ZOMIG) 5 MG tablet [Pharmacy Med Name: ZOLMITRIPTAN TABS 1'S 5MG] 6 tablet 24     Sig: TAKE ONE-HALF (1/2) TABLET AS NEEDED FOR MIGRAINE       Triptans Refill Protocol Passed - 11/17/2019 10:06 AM        Passed - PCP or prescribing provider visit in past 12 months       Last office visit with prescriber/PCP: 3/20/2019 Zeinab Oneal MD OR same dept: 9/4/2019 Maria Caballero CNP OR same specialty: 9/30/2019 Rosanna Flower MD  Last physical: 10/6/2016 Last MTM visit: Visit date not found   Next visit within 3 mo: Visit date not found  Next physical within 3 mo: Visit date not found  Prescriber OR PCP: Zeinab Oneal MD  Last diagnosis associated with med order: 1. Migraine headache  - ZOLMitriptan (ZOMIG) 5 MG tablet [Pharmacy Med Name: ZOLMITRIPTAN TABS 1'S 5MG]; TAKE ONE-HALF (1/2) TABLET AS NEEDED FOR MIGRAINE  Dispense: 6 tablet; Refill: 24    If protocol passes may refill for 12 months if within 3 months of last provider visit (or a total of 15 months).

## 2021-06-03 NOTE — TELEPHONE ENCOUNTER
Ok to notify patient she will need to schedule an appointment to discuss difficulties with sleep?

## 2021-06-03 NOTE — TELEPHONE ENCOUNTER
Medication Request  Medication name:   zolpidem (AMBIEN) 5 MG tablet        Sig - Route: Take 5-10 mg by mouth at bedtime as needed for sleep. - Oral    Class: Historical Med        Pharmacy Name and Location: [facility]  Reason for request: Patient requesting for sleep.    Okay to leave a detailed message: yes

## 2021-06-04 VITALS
DIASTOLIC BLOOD PRESSURE: 70 MMHG | SYSTOLIC BLOOD PRESSURE: 118 MMHG | HEART RATE: 65 BPM | WEIGHT: 179.4 LBS | OXYGEN SATURATION: 98 % | BODY MASS INDEX: 27.68 KG/M2

## 2021-06-04 VITALS
RESPIRATION RATE: 20 BRPM | WEIGHT: 181 LBS | SYSTOLIC BLOOD PRESSURE: 110 MMHG | BODY MASS INDEX: 27.43 KG/M2 | OXYGEN SATURATION: 96 % | TEMPERATURE: 98.2 F | HEART RATE: 86 BPM | HEIGHT: 68 IN | DIASTOLIC BLOOD PRESSURE: 68 MMHG

## 2021-06-04 VITALS
DIASTOLIC BLOOD PRESSURE: 80 MMHG | OXYGEN SATURATION: 98 % | WEIGHT: 175 LBS | SYSTOLIC BLOOD PRESSURE: 104 MMHG | HEART RATE: 73 BPM | TEMPERATURE: 98 F | BODY MASS INDEX: 27 KG/M2

## 2021-06-05 VITALS
SYSTOLIC BLOOD PRESSURE: 114 MMHG | OXYGEN SATURATION: 96 % | DIASTOLIC BLOOD PRESSURE: 80 MMHG | HEIGHT: 68 IN | RESPIRATION RATE: 20 BRPM | BODY MASS INDEX: 28.64 KG/M2 | TEMPERATURE: 98.5 F | WEIGHT: 189 LBS | HEART RATE: 70 BPM

## 2021-06-05 NOTE — TELEPHONE ENCOUNTER
Call from pt       CC: L Lower ABD pain started on Saturday     Getting better over the past 2 days       Has a hx of diverticulitis - last spells she recalls was a few years ago      Has had herself NPO over the weekend and seems to be feeling a bit better     No vomitting - no diarrhea at any time - some nausea  - but better now       Maybe some chills over the weekend as well - better as well        Belly was sore to the touch - not bloated          A/P:   > I did suggest office visit today per protocol  - declined   > I will let provider know but would suggest clear liquids and then advance to bland diet as tolerated - to ETOH, no caffeine, no grease / fatty food       Sivakumar Kraft, RN   Triage and Medication Refills            Reason for Disposition    Age > 60 years    Protocols used: ABDOMINAL PAIN - FEMALE-A-OH

## 2021-06-06 NOTE — TELEPHONE ENCOUNTER
"RN Triage:    Was seen in clinic on 3/10/20 for mild paronychia of right middle finger.  Was treated agressively  with hibiclens soaks, bactroban ointment and oral keflex three times a day due to upcoming hip replacement surgery which is scheduled for 3/16/20.  Is calling back today because finger infection looks worse than it did when she was in clinic.  Has been following physician's advice and taking keflex as ordered; has had 6 pills so far.  Whole tip of finger is red and tender now.  It looks like there is some \"pus\" on the side.  Increased swelling.    Question:  In light of upcoming surgery, what would physician suggest?  She is awaiting a callback today.    La Maddox, RN   [facility]        Reason for Disposition    Looks like a boil, infected sore, deep ulcer or other infected rash (spreading redness, pus)    Protocols used: HAND SWELLING-A-AH      "

## 2021-06-06 NOTE — TELEPHONE ENCOUNTER
Called patient to discuss the infection. It worsened originally but appears improved today.  Offered the patient to come in today, but she notes improvement.  Offered the patient to come in on Monday prior to her surgery to be cleared for surgery.  If the infection has worsened over the weekend, we would cancel the surgery and I would consider lancing of the lesion in clinic if necessary.  If patient worsens acutely over the weekend she will present to urgent care.  If patient feels as though it has improved dramatically, she can bring this to the attention of her surgeon and potentially proceed with surgery at their discretion.

## 2021-06-06 NOTE — TELEPHONE ENCOUNTER
Who is calling:  Patient    Reason for Call:    Paronychia of finger of right hand     Date of last appointment with primary care: 03/10/2020    Okay to leave a detailed message:   Yes    Patient has concerns with her finger infection, states the infection has come to a head and has concerns with surgery being postpone due to infection.    Treatment started 03/10/2020:   treat with mupirocin, kelfex x 5 days, and chlorhexidine    Due to delay with triage CMA advise patient to do warm soak daily for 20 minutes two times a day, and call back 03/13/2020 with status update and to determine the plan of care.        Patient agrees with plan and will call back 03/13/2020 if symptom has worsen or don't improve.

## 2021-06-06 NOTE — TELEPHONE ENCOUNTER
FYI - Status Update  Who is Calling: Patient  Update: Patient stated that she had surgery 3/16 and everything went well.  Okay to leave a detailed message?:  Yes   [phone]

## 2021-06-06 NOTE — PATIENT INSTRUCTIONS - HE
Stop Celebrex 1 week prior to surgery unless otherwise instructed by your surgeon.     Before Your Surgery       Call your surgeon if there is any change in your health. This includes signs of a cold or flu (such as a sore throat, runny nose, cough, rash or fever).       Do not smoke, drink alcohol or take over the counter medicine (unless your surgeon or primary care doctor tells you to) for the 24 hours before and after surgery.       If you take prescribed drugs: Follow your doctor s orders about which medicines to take and which to stop until after surgery.      Eating and drinking prior to surgery: follow the instructions from your surgeon.      Take a shower or bath the night before surgery. Use the soap your surgeon gave you to gently clean your skin. If you do not have soap from your surgeon, use your regular soap. Do not shave or scrub the surgery site. Wear clean pajamas and have clean sheets on your bed.             Hold all supplements, aspirin and NSAIDs for 7 days prior to surgery.    Follow your surgeon's direction on when to stop eating and drinking prior to surgery.    Your surgeon will be managing your pain after your surgery.      Remove all jewelry and metal piercings before your surgery.     Remove nail polish from fingers before surgery.

## 2021-06-06 NOTE — TELEPHONE ENCOUNTER
Who is calling:  Patient  Reason for Call:  Has opted to wait on hold for RN  Transferred.   Date of last appointment with primary care: NA  Okay to leave a detailed message: No

## 2021-06-06 NOTE — PROGRESS NOTES
Assessment/Plan:     Patient presents to clinic for evaluation of right middle fingernail tenderness and appears to have a very mild paronychia. Although this could be treated topically, with patient's upcoming surgery we chose to be very aggressive and treat with mupirocin, kelfex x 5 days, and chlorhexidine. Contacted surgeon to determine if surgery could proceed, I see no reason to delay based on this clinical examination.     Problem List Items Addressed This Visit     None      Visit Diagnoses     Paronychia of finger of right hand    -  Primary    Relevant Medications    mupirocin (BACTROBAN) 2 % ointment    cephalexin (KEFLEX) 500 MG capsule    chlorhexidine (HIBICLENS) 4 % external liquid          Return to clinic for annual wellness.     Subjective:      Kaitlynn Ramirez is a 64 y.o. female who presents to clinic for possible finger infection.    Patient removed the artificial nail she had on prior to surgery and then noticed a little bit of oozing from the right fingernail.  It is slightly tender to palpation.  She has had no systemic symptoms and otherwise would not even bother coming in as her symptoms are so mild.  However, she is upcoming hip replacement surgery and is very nervous about having this rescheduled.      Past Medical History, Family History, and Social History reviewed.     Current Outpatient Medications on File Prior to Visit   Medication Sig Dispense Refill     calcium polycarbophil (FIBERCON) 625 mg tablet Take 1,250 mg by mouth daily.       celecoxib (CELEBREX) 200 MG capsule Take 200 mg by mouth.       cholecalciferol, vitamin D3, (VITAMIN D3) 1,000 unit capsule Take 1,000 Units by mouth daily.       LACTOBACILLUS ACIDOPHILUS (PROBIOTIC ORAL) Take 1 capsule by mouth daily.        magnesium 30 mg tablet Take 30 mg by mouth 2 (two) times a day.       multivitamin therapeutic (THERAGRAN) tablet Take 1 tablet by mouth daily.       ZOLMitriptan (ZOMIG) 5 MG tablet TAKE ONE-HALF  (1/2) TABLET AS NEEDED FOR MIGRAINE 12 tablet 12     No current facility-administered medications on file prior to visit.        Review of systems is as stated in HPI.  The remainder of the 10 system review is otherwise negative.    Objective:     /80   Pulse 73   Temp 98  F (36.7  C)   Wt 175 lb (79.4 kg)   SpO2 98%   BMI 27.00 kg/m   Body mass index is 27 kg/m .    Gen: Alert, NAD, appears stated age, normal hygiene   SKIN: Trace moist appearance and slight tenderness to palpation along the lateral aspect of the nail of the right middle finger without significant erythema or induration    This note has been dictated using voice recognition software. Any grammatical or context distortions are unintentional and inherent to the the software.     Rosanna Lewis MD

## 2021-06-06 NOTE — PROGRESS NOTES
Preoperative Exam    Scheduled Procedure: Right hip replacement  Surgery Date:  3-16-20  Surgery Location: [facility]  Fax#     Surgeon: Dr Hernandez    Assessment/Plan:     1. Preop general physical exam  No significant surgical or anesthetic risks or cautions identified.  She may proceed with surgery as scheduled without further clinical clarification or evaluation and may proceed with choice of anesthesia.  She will hold any vitamins, supplements, NSAIDs, and aspirin for 1 week prior to surgery.  Specifically, she will also hold Celebrex for 1 week prior to surgery.  - HM2(CBC w/o Differential)  - Electrocardiogram Perform and Read    2. Primary osteoarthritis of right hip  To proceed with joint replacement as scheduled.    3. Migraine without status migrainosus, not intractable, unspecified migraine type  Managed with zolmitriptan as needed.    4. Insomnia, unspecified type  Adequately managed without need for medication currently.    Surgical Procedure Risk: Intermediate (reported cardiac risk generally 1-5%)  Have you had prior anesthesia?: Yes  Have you or any family members had a previous anesthesia reaction:  No  Do you or any family members have a history of a clotting or bleeding disorder?: No  Cardiac Risk Assessment: no increased risk for major cardiac complications    APPROVAL GIVEN to proceed with proposed procedure, without further diagnostic evaluation    Functional Status: Independent  Patient plans to recover at home with family.     Subjective:      Kaitlynn Ramirez is a 64 y.o. female who presents for a preoperative consultation.  She has had progressive pain and dysfunction in her right hip due to primary osteoarthritis, inadequately managed with conservative measures including NSAIDs and corticosteroid injections, requiring surgical treatment.    History of insomnia, has taken zolpidem in the past, not requiring this currently.  History of allergic rhinitis which is not currently  active.  History of migraine headaches for which she takes zolmitriptan as needed.    All other systems reviewed and are negative, other than those listed in the HPI.    Pertinent History  Do you have difficulty breathing or chest pain after walking up a flight of stairs: No  History of obstructive sleep apnea: No  Steroid use in the last 6 months: No  Frequent Aspirin/NSAID use: No  Prior Blood Transfusion: No  Prior Blood Transfusion Reaction: No  If for some reason prior to, during or after the procedure, if it is medically indicated, would you be willing to have a blood transfusion?:  There is no transfusion refusal.    Current Outpatient Medications   Medication Sig Dispense Refill     calcium polycarbophil (FIBERCON) 625 mg tablet Take 1,250 mg by mouth daily.       cholecalciferol, vitamin D3, (VITAMIN D3) 1,000 unit capsule Take 1,000 Units by mouth daily.       hydrOXYzine pamoate (VISTARIL) 50 MG capsule Take 1 capsule (50 mg total) by mouth 4 (four) times a day as needed for itching. 20 capsule 0     LACTOBACILLUS ACIDOPHILUS (PROBIOTIC ORAL) Take 1 capsule by mouth daily.        multivitamin therapeutic (THERAGRAN) tablet Take 1 tablet by mouth daily.       predniSONE (DELTASONE) 20 MG tablet Start 1 PO three times a day for 2 days, then 1 PO two times a day for 5 days, then 1 PO daily for 5 days then stop. 21 tablet 0     ZOLMitriptan (ZOMIG) 5 MG tablet TAKE ONE-HALF (1/2) TABLET AS NEEDED FOR MIGRAINE 12 tablet 12     zolpidem (AMBIEN) 5 MG tablet Take 5-10 mg by mouth at bedtime as needed for sleep.       No current facility-administered medications for this visit.         No Known Allergies    Patient Active Problem List   Diagnosis     Allergic Rhinitis     Migraine Headache     Insomnia     History of migraine     Hives       Past Medical History:   Diagnosis Date     Migraine        Past Surgical History:   Procedure Laterality Date     FOOT SURGERY Left 1994     RETINAL DETACHMENT SURGERY Left  "11/2015       Social History     Socioeconomic History     Marital status:      Spouse name: Not on file     Number of children: Not on file     Years of education: Not on file     Highest education level: Not on file   Occupational History     Not on file   Social Needs     Financial resource strain: Not on file     Food insecurity:     Worry: Not on file     Inability: Not on file     Transportation needs:     Medical: Not on file     Non-medical: Not on file   Tobacco Use     Smoking status: Never Smoker     Smokeless tobacco: Never Used   Substance and Sexual Activity     Alcohol use: No     Comment: social and infrequent     Drug use: No     Sexual activity: Not on file   Lifestyle     Physical activity:     Days per week: Not on file     Minutes per session: Not on file     Stress: Not on file   Relationships     Social connections:     Talks on phone: Not on file     Gets together: Not on file     Attends Scientologist service: Not on file     Active member of club or organization: Not on file     Attends meetings of clubs or organizations: Not on file     Relationship status: Not on file     Intimate partner violence:     Fear of current or ex partner: Not on file     Emotionally abused: Not on file     Physically abused: Not on file     Forced sexual activity: Not on file   Other Topics Concern     Not on file   Social History Narrative     Not on file       Patient Care Team:  Zeinab Oneal MD as PCP - General  Zeinab Oneal MD as Assigned PCP  Marcy Hernandez MD (Orthopedic Surgery)          Objective:     Vitals:    03/04/20 1338   Resp: 20   Weight: 181 lb (82.1 kg)   Height: 5' 7.5\" (1.715 m)         Physical Exam:  Physical Examination: General appearance - alert, well appearing, and in no distress, oriented to person, place, and time and normal appearing weight; antalgic gait with use of walking stick  Mental status - alert, oriented to person, place, and time, normal mood, " behavior, speech, dress, motor activity, and thought processes  Eyes - pupils equal and reactive, extraocular eye movements intact; glasses  Ears - bilateral TM's and external ear canals normal  Nose - normal and patent, no erythema, discharge or polyps  Mouth - mucous membranes moist, pharynx normal without lesions  Neck - supple, no significant adenopathy  Lymphatics - no palpable lymphadenopathy, no hepatosplenomegaly  Chest - clear to auscultation, no wheezes, rales or rhonchi, symmetric air entry  Heart - normal rate, regular rhythm, normal S1, S2, no murmurs, rubs, clicks or gallops  Abdomen - soft, nontender, nondistended, no masses or organomegaly  Neurological - alert, oriented, normal speech, no focal findings or movement disorder noted  Musculoskeletal - no joint tenderness, deformity or swelling  Extremities - peripheral pulses normal, no pedal edema, no clubbing or cyanosis  Skin - normal coloration and turgor, no rashes, no suspicious skin lesions noted      There are no Patient Instructions on file for this visit.    I ordered and personally reviewed a 12-lead EKG which reveals normal sinus rhythm, no ischemic or arrhythmic changes.    Labs:  Recent Results (from the past 24 hour(s))   HM2(CBC w/o Differential)    Collection Time: 03/04/20  1:44 PM   Result Value Ref Range    WBC 6.8 4.0 - 11.0 thou/uL    RBC 4.48 3.80 - 5.40 mill/uL    Hemoglobin 14.0 12.0 - 16.0 g/dL    Hematocrit 41.7 35.0 - 47.0 %    MCV 93 80 - 100 fL    MCH 31.3 27.0 - 34.0 pg    MCHC 33.6 32.0 - 36.0 g/dL    RDW 11.8 11.0 - 14.5 %    Platelets 224 140 - 440 thou/uL    MPV 8.6 7.0 - 10.0 fL   Electrocardiogram Perform and Read    Collection Time: 03/04/20  2:03 PM   Result Value Ref Range    SYSTOLIC BLOOD PRESSURE      DIASTOLIC BLOOD PRESSURE      VENTRICULAR RATE 74 BPM    ATRIAL RATE 74 BPM    P-R INTERVAL 144 ms    QRS DURATION 88 ms    Q-T INTERVAL 410 ms    QTC CALCULATION (BEZET) 455 ms    P Axis 65 degrees    R AXIS -1  degrees    T AXIS 36 degrees    MUSE DIAGNOSIS       Normal sinus rhythm  Normal ECG  When compared with ECG of 07-JUL-2017 08:22,  No significant change was found         Immunization History   Administered Date(s) Administered     Hep A, Adult IM (19yr & older) 07/17/2017, 09/17/2018     Influenza, inj, historic,unspecified 11/10/2008, 11/05/2012, 10/15/2013, 09/23/2019     Influenza, seasonal,quad inj 6-35 mos 11/24/2009, 10/12/2010     Tdap 10/06/2016     ZOSTER, RECOMBINANT, IM 12/04/2018, 02/05/2019           Electronically signed by Zeinab Oneal MD 03/04/20 1:39 PM

## 2021-06-07 NOTE — TELEPHONE ENCOUNTER
Returned patient call about her finger infection. Patient had surgery and did well, she is now two weeks out. She has been to PT and is now doing virtual visits. Patient had some concerns about a telephone encounter she had and I will relay this to my supervisor.

## 2021-06-09 NOTE — TELEPHONE ENCOUNTER
If she would like we could switch to bactrim but that will not cover diverticulitis as well. Risk of tendon rupture is low unless she has a prior history of this issue. I have sent a new prescription for the bactrim twice daily for 10 days in case she would like to switch. Please call her with this info.

## 2021-06-09 NOTE — PROGRESS NOTES
Assessment/Plan:      Problem List Items Addressed This Visit     None      Visit Diagnoses     Acute cystitis without hematuria    -  Primary    Relevant Medications    ciprofloxacin HCl (CIPRO) 250 MG tablet    Urinary urgency        Relevant Orders    Urinalysis-UC if Indicated (Completed)    Culture, Urine    Abdominal pain, left lower quadrant        Relevant Orders    HM2(CBC w/o Differential) (Completed)    Comprehensive Metabolic Panel      Urine results concerning for possible UTI. We will treat with cipro 250 mg two times a day. I am going to treat for 10 days because I am concerned about possible diverticulitis. If her urine culture comes back negative and symptoms do not resolve, I would recommend CT to evaluate for possible diverticulitis. CMP and CBC also pending to further evaluate the pain.    Patient Instructions   1. We will notify you of lab results.  2. Cipro 250 mg twice daily for 10 days.  3. If your urine culture is normal and your symptoms are persistent, we should proceed with CT scan of the lower abdomen.          Subjective:   Kaitlynn Ramirez is a 65 y.o. female who presents today with a couple of concerns. She has a history of just a couple of urinary infections in her life. She report she is having some urgency and pain prior to urination. No dysuria or hematuria. Both of her symptoms started since her hip replacement in March 2020. She is having some tightness in the hip and low back. She usually has a daily bowel movement but now she is having multiple stools per day. No hard stools. She does sometimes have a loose stool. No fever or chills. No weight changes.     Patient Active Problem List   Diagnosis     Allergic Rhinitis     Migraine Headache     Insomnia      Past Medical History:   Diagnosis Date     Migraine      Past Surgical History:   Procedure Laterality Date     CATARACT EXTRACTION Left      FOOT SURGERY Left 1994     RETINAL DETACHMENT SURGERY Left 11/2015        Review of System: Relevant items noted in HPI. ROS otherwise negative.       Objective:     Vitals:    07/24/20 1047   BP: 118/70   Pulse: 65   SpO2: 98%   Weight: 179 lb 6.4 oz (81.4 kg)       Physical Exam  Cardiovascular:      Rate and Rhythm: Normal rate and regular rhythm.      Heart sounds: No murmur.   Pulmonary:      Effort: Pulmonary effort is normal.      Breath sounds: Normal breath sounds. No wheezing or rhonchi.   Abdominal:      General: Abdomen is flat. Bowel sounds are normal.      Palpations: Abdomen is soft. There is no hepatomegaly or splenomegaly.      Tenderness: There is abdominal tenderness in the left lower quadrant. There is no guarding or rebound.   Musculoskeletal:      Right lower leg: No edema.      Left lower leg: No edema.   Skin:     Findings: No rash.       Results for orders placed or performed in visit on 07/24/20   Urinalysis-UC if Indicated   Result Value Ref Range    Color, UA Yellow Colorless, Yellow, Straw, Light Yellow    Clarity, UA Clear Clear    Glucose, UA Negative Negative    Bilirubin, UA Negative Negative    Ketones, UA Negative Negative    Specific Gravity, UA 1.020 1.005 - 1.030    Blood, UA Negative Negative    pH, UA 5.5 5.0 - 8.0    Protein, UA Negative Negative mg/dL    Urobilinogen, UA 0.2 E.U./dL 0.2 E.U./dL, 1.0 E.U./dL    Nitrite, UA Positive (!) Negative    Leukocytes, UA Small (!) Negative    Bacteria, UA Many (!) None Seen hpf    RBC, UA 0-2 None Seen, 0-2 hpf    WBC, UA 10-25 (!) None Seen, 0-5 hpf    Squam Epithel, UA 0-5 None Seen, 0-5 lpf    Mucus, UA Few (!) None Seen lpf   HM2(CBC w/o Differential)   Result Value Ref Range    WBC 5.9 4.0 - 11.0 thou/uL    RBC 4.48 3.80 - 5.40 mill/uL    Hemoglobin 13.6 12.0 - 16.0 g/dL    Hematocrit 43.5 35.0 - 47.0 %    MCV 97 80 - 100 fL    MCH 30.4 27.0 - 34.0 pg    MCHC 31.3 (L) 32.0 - 36.0 g/dL    RDW 12.7 11.0 - 14.5 %    Platelets 194 140 - 440 thou/uL    MPV 12.4 8.5 - 12.5 fL

## 2021-06-09 NOTE — TELEPHONE ENCOUNTER
Symptom  Describe your symptoms: The patient states she has mild pressure in her vaginal area when she has to urinate. The patient denies fever, hematuria or dysuria. The patient states she has 7-8 stools a day, soft stools and sometimes loose stools.  The patient states she spoke to her orthopedic's nurse this morning and the nurse said to call PCP.   Any pain: yes slight pressure   New/Ongoing: New  How long have you been having symptoms: 1  month(s)  Have you been seen for this:  No  Appointment offered?: Patient declines. The patient states would like a call from Zeinab Oneal MD's team.  Triage offered?: Yes, declined  Home remedies tried: None  Requested Pharmacy: [facility]  Okay to leave a detailed message? Yes

## 2021-06-09 NOTE — PATIENT INSTRUCTIONS - HE
1. We will notify you of lab results.  2. Cipro 250 mg twice daily for 10 days.  3. If your urine culture is normal and your symptoms are persistent, we should proceed with CT scan of the lower abdomen.

## 2021-06-09 NOTE — TELEPHONE ENCOUNTER
"RN Triage  Kaitlynn is calling today, was in today for UTI. Prescribed ciprofloxacin. She calls today because she read the side effects on the patient information handout from the pharmacy. She is very concerned for the noted \"tendinitis and tendon rupture.\"    I advised Kaitlynn utilizing Micromedex and patient education as reference on common side effects, however Kaitlynn is interested in getting Dr. Prater's perspective and seeing if there is another drug that may be safer for her to use. I will forward this message on to Dr. Prater (prescribing physician).    Fany Willams RN  [facility] Nurse Advisor      Reason for Disposition    Request for URGENT new prescription or refill of 'essential' medication (i.e., likelihood of harm to patient if not taken) and triager unable to fill per department policy    Additional Information    Negative: MORE THAN A DOUBLE DOSE of a prescription or over-the-counter (OTC) drug    Negative: DOUBLE DOSE (an extra dose or lesser amount) of over-the-counter (OTC) drug and any symptoms (e.g., dizziness, nausea, pain, sleepiness)    Negative: DOUBLE DOSE (an extra dose or lesser amount) of prescription drug and any symptoms (e.g., dizziness, nausea, pain, sleepiness)    Negative: Took another person's prescription drug    Negative: DOUBLE DOSE (an extra dose or lesser amount) of prescription drug and NO symptoms (Exception: a double dose of antibiotics)    Negative: Diabetes drug error or overdose (e.g., took wrong type of insulin or took extra dose)    Negative: Caller has medication question about med not prescribed by PCP and triager unable to answer question (e.g., compatibility with other med, storage)    Protocols used: MEDICATION QUESTION CALL-A-OH      "

## 2021-06-11 NOTE — PROGRESS NOTES
Assessment/Plan:     1. Chest pain  Noncardiac.  Question whether she has a low-grade lung infection based on history and will treat with azithromycin as noted.  Notify with recurrence.  Encouraged initiation of her regular exercise regimen for cardiac benefits.    2. Cough  I am concerned that the bit of opacity seen in her lower lungs could be representative of low-grade infection based on ongoing cough.  Will treat with a 5 day course of azithromycin.  Notify with persistence or worsening.    3. Screening for cervical cancer  Will await screening Pap smear results.    4. Travel advice encounter  We discussed typhoid vaccination options, she will consider paying out of pocket for oral vaccine versus letting me know if she would like a written prescription for intramuscular vaccination.  She is provided hepatitis A vaccine #1 today.      Subjective:      Kaitlynn Ramirez is a 62 y.o. female presented to clinic today for follow-up of recent hospitalization at [facility] from 7 6/17 through 7/7/17.  She had been admitted to the emergency room with chest pressure and mild inferior EKG changes improvement with both nitroglycerin and aspirin.  She was monitored on telemetry.  Her serial troponins were unremarkable.  She had an echocardiogram that was normal.  She had a nuclear stress test that was also normal.  Her CT PE run was negative for PE but did show some bilateral lower lobe opacities that were thought to be representing atelectasis.  She had mild hypoxia at the time that resolved during her time at the hospital.  At time of discharge she was instructed to be on omeprazole once daily to assist with potential reflux contributing.  She has not had recurrence of the pain since being home.  She was upset that she found out she was quite out of shape based on the stress test findings, is interested in initiating a home exercise regimen.  She is requesting a Pap smear today.  She is interested in initiating her  hepatitis A vaccination series today.    Describes ongoing cough.  She had had a cough for about a month prior to hospitalization, mostly nonproductive but feels very deep.  Does not necessarily feel short of breath though she does feel as though it is impacting her perhaps his knee some respiratory changes.  She denies fevers or chills.    Current Outpatient Prescriptions   Medication Sig Dispense Refill     amitriptyline (ELAVIL) 50 MG tablet Take 50 mg by mouth at bedtime.       atovaquone-proguanil (MALARONE) 250-100 mg Tab Take 1 tablet by mouth daily with breakfast. Begin taking 1-2 days prior to your travel,continue during travel and until 7 days after travel 22 tablet 0     calcium polycarbophil (FIBERCON) 625 mg tablet Take 1,250 mg by mouth daily.       cholecalciferol, vitamin D3, (VITAMIN D3) 1,000 unit capsule Take 1,000 Units by mouth daily.       ciprofloxacin HCl (CIPRO) 500 MG tablet Take one tab by mouth twice daily as needed for traveler's diarrhea.  Stop when diarrhea resolved, or up to 5 days. 10 tablet 0     LACTOBACILLUS ACIDOPHILUS (PROBIOTIC ORAL) Take 1 capsule by mouth daily.        multivitamin therapeutic (THERAGRAN) tablet Take 1 tablet by mouth daily.       omeprazole (PRILOSEC) 20 MG capsule Take 1 capsule (20 mg total) by mouth daily before breakfast. 30 capsule 0     typhoid (VIVOTIF) SR capsule Take 1 capsule by mouth every other day. 4 capsule 0     ZOLMitriptan (ZOMIG) 5 MG tablet Take 2.5 mg by mouth daily as needed for migraine.       zolpidem (AMBIEN) 5 MG tablet Take 5-10 mg by mouth at bedtime as needed for sleep.       No current facility-administered medications for this visit.        Past Medical History, Family History, and Social History reviewed.  Past Medical History:   Diagnosis Date     Migraine      Past Surgical History:   Procedure Laterality Date     FOOT SURGERY Left 1994     RETINAL DETACHMENT SURGERY Left 11/2015     Review of patient's allergies indicates no  "known allergies.  Family History   Problem Relation Age of Onset     Kidney cancer Mother      Basal cell carcinoma Mother      No Medical Problems Sister      Basal cell carcinoma Brother      No Medical Problems Brother      No Medical Problems Brother      Breast cancer Neg Hx      Colon cancer Neg Hx      Prostate cancer Neg Hx      Ovarian cancer Neg Hx      Social History     Social History     Marital status:      Spouse name: N/A     Number of children: N/A     Years of education: N/A     Occupational History     Not on file.     Social History Main Topics     Smoking status: Never Smoker     Smokeless tobacco: Not on file     Alcohol use No      Comment: social and infrequent     Drug use: No     Sexual activity: Not on file     Other Topics Concern     Not on file     Social History Narrative         Review of systems is as stated in HPI, and the remainder of the 10 system review is otherwise negative.    Objective:     Vitals:    07/17/17 1438   BP: 102/70   Patient Site: Right Arm   Patient Position: Sitting   Cuff Size: Adult Large   Pulse: 82   Resp: 20   Temp: 98  F (36.7  C)   TempSrc: Oral   SpO2: 95%   Weight: 178 lb (80.7 kg)   Height: 5' 7.5\" (1.715 m)    Body mass index is 27.47 kg/(m^2).    Alert female.  Mucous membranes moist.  Tympanic membrane is pearly.  Pharynx is clear.  No sinus tenderness.  Heart with regular rate and rhythm.  Lungs clear.  Extremities without edema.      This note has been dictated using voice recognition software. Any grammatical or context distortions are unintentional and inherent to the the software.   "

## 2021-06-11 NOTE — TELEPHONE ENCOUNTER
Who is calling:  Patient's spouse  Reason for Call:  Just a FYI, patient will be coming in for labs today. Scheduled today.  Date of last appointment with primary care: NA  Okay to leave a detailed message: No

## 2021-06-13 NOTE — TELEPHONE ENCOUNTER
Refill Approved    Rx renewed per Medication Renewal Policy. Medication was last renewed on 11/17/2019 with 12 refills.  Last office visit: 7/24/2020 with Dr SHALONDA Prater     [facility] [place]Med Refill 12/3/2020     Requested Prescriptions   Pending Prescriptions Disp Refills     ZOLMitriptan (ZOMIG) 5 MG tablet [Pharmacy Med Name: ZOLMITRIPTAN TABS 1'S 5MG] 12 tablet 11     Sig: TAKE ONE-HALF (1/2) TABLET AS NEEDED FOR MIGRAINE       Triptans Refill Protocol Passed - 12/2/2020  5:46 PM        Passed - PCP or prescribing provider visit in past 12 months       Last office visit with prescriber/PCP: 3/20/2019 Zeinab Oneal MD OR same dept: 7/24/2020 Consuelo Prater MD OR same specialty: 7/24/2020 Consuelo Prater MD  Last physical: 3/4/2020 Last MTM visit: Visit date not found   Next visit within 3 mo: Visit date not found  Next physical within 3 mo: Visit date not found  Prescriber OR PCP: Zeinab Oneal MD  Last diagnosis associated with med order: 1. Migraine headache  - ZOLMitriptan (ZOMIG) 5 MG tablet [Pharmacy Med Name: ZOLMITRIPTAN TABS 1'S 5MG]; TAKE ONE-HALF (1/2) TABLET AS NEEDED FOR MIGRAINE  Dispense: 12 tablet; Refill: 11    If protocol passes may refill for 12 months if within 3 months of last provider visit (or a total of 15 months).

## 2021-06-14 NOTE — PATIENT INSTRUCTIONS - HE
Ice or heat as needed.    Ibuprofen 800 mg 3 times daily with food.  Acetaminophen as needed.  Cyclobenzaprine as a muscle relaxer, this can be helpful for muscle spasm.  Use it as needed.  Can cause drowsiness so do not drive after taking.    I have placed an order for physical therapy, they will contact you to schedule.

## 2021-06-14 NOTE — TELEPHONE ENCOUNTER
S: Back pain.  B: 1/2/21 pain start across her lower back.  Rates pain as moderate.  No trauma to back. Not having any muscle spasms.  CMS intact to bilateral L/E.   A: Per guideline advised patient to make an appointment to see provider within 3 days.  Reviewed care guidelines  R: Transferred to [facility] to make a Monday appointment. Encouraged Kaitlynn to call back if she developed any changes.    Damari Herrera RN, MA  Triage Nurse Advisor    Reason for Disposition    [1] MODERATE back pain (e.g., interferes with normal activities) AND [2] present > 3 days    Additional Information    Negative: Passed out (i.e., lost consciousness, collapsed and was not responding)    Negative: Shock suspected (e.g., cold/pale/clammy skin, too weak to stand, low BP, rapid pulse)    Negative: Sounds like a life-threatening emergency to the triager    Negative: [1] SEVERE back pain (e.g., excruciating) AND [2] sudden onset AND [3] age > 60    Negative: [1] Unable to urinate (or only a few drops) > 4 hours AND [2] bladder feels very full (e.g., palpable bladder or strong urge to urinate)    Negative: [1] Loss of bladder or bowel control (urine or bowel incontinence; wetting self, leaking stool) AND [2] new onset    Negative: Numbness in groin or rectal area (i.e., loss of sensation)    Negative: [1] SEVERE abdominal pain AND [2] present > 1 hour    Negative: [1] Abdominal pain AND [2] age > 60    Negative: Weakness of a leg or foot (e.g., unable to bear weight, dragging foot)    Negative: Unable to walk    Negative: Patient sounds very sick or weak to the triager    Negative: [1] SEVERE back pain (e.g., excruciating, unable to do any normal activities) AND [2] not improved 2 hours after pain medicine    Negative: [1] Pain radiates into the thigh or further down the leg AND [2] both legs    Negative: [1] Fever > 100.0 F (37.8 C) AND [2] flank pain (i.e., in side, below ribs and above hip)    Negative: [1] Pain or burning with passing  "urine (urination) AND [2] flank pain (i.e., in side, below ribs and above hip)    Negative: High-risk adult (e.g., history of cancer, HIV, or IV drug abuse)    Negative: [1] Numbness in an arm or hand (i.e., loss of sensation) AND [2] upper back pain    Negative: Numbness in a leg or foot (i.e., loss of sensation)    Negative: [1] Fever AND [2] no symptoms of UTI  (Exception: has generalized muscle pains, not localized back pain)    Negative: Rash in same area as pain (may be described as \"small blisters\")    Negative: Blood in urine (red, pink, or tea-colored)    Protocols used: BACK PAIN-A-AH      "

## 2021-06-14 NOTE — PROGRESS NOTES
"  Assessment & Plan     Acute midline low back pain without sciatica  Will await radiology interpretation of x-rays to confirm that there are no acute findings.  Advised ongoing use of ibuprofen and acetaminophen on a regular scheduled basis in management of pain.  Advise discontinuation of tramadol.  Prescription provided for cyclobenzaprine to use sparingly as needed for muscle spasm type pain, discussed That this medication can be sedating and to avoid driving or combining with sedatives or alcohol.  Reviewed range of motion exercises.  Referral placed to physical therapy.  Notify with onset additional symptoms, worsening, or failure to improve.  - XR Lumbar Spine 2 or 3 VWS  - Ambulatory referral to PT/OT  - cyclobenzaprine (FLEXERIL) 5 MG tablet  Dispense: 30 tablet; Refill: 0      Review of the result(s) of each unique test - Independent review of x-ray               BMI:   Estimated body mass index is 29.16 kg/m  as calculated from the following:    Height as of this encounter: 5' 7.5\" (1.715 m).    Weight as of this encounter: 189 lb (85.7 kg).       Return in about 4 weeks (around 2/1/2021) for Annual Wellness Visit.    Zeinab Oneal MD  [facility]     Kaitlynn Ramirez is 65 y.o. and presents to clinic today for the following health issues:    Onset of low back pain 3 days ago, midline low back.  No injury.  No radiation of pain.  Bandlike across back.  Normal bowel and bladder function.  No numbness, tingling, or weakness.  History of a physical therapist telling her she likely has some low back dysfunction when she was receiving physical therapy for hip recovery.  Told in the past that she has spondylolisthesis or spondylolysis, she is not sure which.    Review of Systems  As above      Objective    /80   Pulse 70   Temp 98.5  F (36.9  C) (Tympanic)   Resp 20   Ht 5' 7.5\" (1.715 m)   Wt 189 lb (85.7 kg)   SpO2 96%   BMI 29.16 kg/m    Body mass " index is 29.16 kg/m .  Physical Exam  Alert female.  Able to stand with normal stance.  Normal gait.  Slow to arise from chair.  Forward flexion and rotation intact.  Lateral bending and extension both reduced significantly due to pain.  5 out of 5 strength throughout right lower extremity.  4+ out of 5 strength with extension and flexion at the knee, otherwise 5 out of 5 strength throughout the remainder of her left lower extremity.  Symmetric deep tendon reflexes.  Sensation intact.  She has mild muscle spasm of the paraspinous muscles.  Tenderness to palpation in the midline low back approximately L4-L5 levels.  No skin abnormalities in that area.    I ordered and personally reviewed 2 view x-rays of the lumbar spine.  Image quality is somewhat poor in that it is hard to see the outline of the vertebrae and pedicles clearly.  I do not see any obvious fracture, infiltrative disease, or other abnormalities.  Radiology interpretation pending.

## 2021-06-15 NOTE — PATIENT INSTRUCTIONS - HE
"Continue to work on hydration, avoidance of caffeine, and healthy sleep habits.  Consider beginning melatonin 3 to 10 mg at bedtime to help with your sleep.  Continue magnesium and vitamin D supplements.  Check out the book \"Heal your Headache: The 1, 2, 3...\" by Dr. Abdiaziz Osborn, which has great ideas to help reduce frequency of headaches and to identify triggers.  Update me with how you are doing.  If things are not improving, we could consider starting a daily medication such as starting amitriptyline again or trying topiramate (Topamax) which can be helpful with headaches and also is more likely to cause weight loss.  Consider massage, chiropractor, or similar to help with upper back tension that can trigger migraines.  "

## 2021-06-15 NOTE — PROGRESS NOTES
"Kaitlynn Ramirez is a 65 y.o. female who is being evaluated via a billable video visit.      How would you like to obtain your AVS? MyChart.  If dropped from the video visit, the video invitation should be resent by: Text to cell phone: [phone]  Will anyone else be joining your video visit? No      Video Start Time: 9:32    Assessment & Plan     Migraine without status migrainosus, not intractable, unspecified migraine type  We discussed multiple factors that could trigger or worsen migraines.  Discussed importance of good sleep, consider trial of melatonin.  Will try switching from zolmitriptan to rizatriptan to see if this has better efficacy with onset of migraines.  Encouraged efforts in regards to weight loss, will be joining weight watchers.  Continue to avoid caffeine.  Continue to focus on hydration.  Continue magnesium and vitamin D supplements.  Consider massage therapy or similar to address upper back attention.  If inadequate improvement, could consider resumption of amitriptyline versus initiation of other migraine prophylaxis such as topiramate, which would be an option given her recent weight gain, or propranolol.  - rizatriptan (MAXALT) 10 MG tablet; Take 1 tablet (10 mg total) by mouth as needed for migraine. May repeat in 2 hours if needed         BMI:   Estimated body mass index is 29.16 kg/m  as calculated from the following:    Height as of 1/4/21: 5' 7.5\" (1.715 m).    Weight as of 1/4/21: 189 lb (85.7 kg).   The following high BMI interventions were performed this visit: lifestyle education regarding diet    Return in about 2 months (around 5/3/2021) for Annual Wellness Visit.    Zeinab Oneal MD  [facility]    Subjective   Kaitlynn Ramirez is 65 y.o. and presents today for the following health issues   HPI   Evaluated by video visit today in regards to worsening migraine headaches.  Onset of headaches during grade school, had been on amitriptyline " prophylactically for many years, discontinued that in approximately 2018 with hopes that having gone through menopause the migraines would stay away.  She had done well, having about 1 migraine a month usually lasting up to 3 days but responsive to Zomig.  More recently has had more frequent migraines and had a migraine a few weeks ago that lasted a full 10 days before resolving.  They usually start her left brow and radiate to her eye, sometimes accompanied by nausea but debilitating when present.  Has been taking magnesium and vitamin D supplement daily.  Admits that she has had some chronic difficulties with sleeping, in the past very poor sleep for several days in a row could be a trigger, has not had sleep deprivation to that severity but has had some onset insomnia of late.  She avoids caffeine chronically.  She tends to carry some tension in her upper back, recently had a little bit of left-sided neck pain but that is resolved.  No new migraine symptoms.  No new changes in health.        Objective       Vitals:  No vitals were obtained today due to virtual visit.    Physical Exam  Alert female.  Face moves symmetrically.  Able to speak in full sentences without respiratory distress.  Appropriate affect.          Video-Visit Details    Type of service:  Video Visit    Video End Time (time video stopped): 9:52   Of note, video froze at 948 and so we transition to phone call to complete the visit.  Originating Location (pt. Location): Home    Distant Location (provider location):  [facility]     Platform used for Video Visit: Mirriad

## 2021-06-18 NOTE — PATIENT INSTRUCTIONS - HE
Patient Instructions by Rosanna Lewis MD at 3/10/2020 11:10 AM     Author: Rosanna Lewis MD Service: -- Author Type: Physician    Filed: 3/10/2020 11:20 AM Encounter Date: 3/10/2020 Status: Signed    : Rosanna Lewis MD (Physician)         Patient Education     Paronychia of the Finger or Toe  Paronychia is an infection near a fingernail or toenail. It usually occurs when an opening in the cuticle or an ingrown toenail lets bacteria under the skin.  The infection will need to be drained if pus is present. If the infection has been caught early, you may need only antibiotic treatment. Healing will take about 1 to 2 weeks.  Home care  Follow these guidelines when caring for yourself at home:    Clean and soak the toe or finger. Do this 2 times a day for the first 3 days. To do so:  ? Soak your foot or hand in a tub of warm water for 5 minutes. Or hold your toe or finger under a faucet of warm running water for 5 minutes.  ? Clean any crust away with soap and water using a cotton swab.  ? Put antibiotic ointment on the infected area.    Change the dressing daily or any time it gets dirty.    If you were given antibiotics, take them as directed until they are all gone.    If your infection is on a toe, wear comfortable shoes with a lot of toe room. You can also wear open-toed sandals while your toe heals.    You may use over-the-counter medicine (acetaminophen or ibuprofen to help with pain, unless another medicine was prescribed. If you have chronic liver or kidney disease, talk with your healthcare provider before using these medicines. Also talk with your provider if you've had a stomach ulcer or GI (gastrointestinal) bleeding.  Prevention  The following can prevent paronychia:    Avoid cutting or playing with your cuticles at home.    Don't bite your nails.    Don't suck on your thumbs or fingers.  Follow-up care  Follow up with your healthcare provider, or as advised.  When to seek  medical advice  Call your healthcare provider right away if any of these occur:    Redness, pain, or swelling of the finger or toe gets worse    Red streaks in the skin leading away from the wound    Pus or fluid draining from the nail area    Fever of 100.4 F (38 C) or higher, or as directed by your provider  Date Last Reviewed: 8/1/2016 2000-2017 [employer]. [address] [place] [address]. All rights reserved. This information is not intended as a substitute for professional medical care. Always follow your healthcare professional's instructions.

## 2021-06-20 NOTE — PROGRESS NOTES
Assessment/Plan:     1. Migraine headache  Encourage efforts at healthy including adequate hydration, adequate sleep, avoidance of caffeine, and avoid medication overuse.  Refill provided of Zomig to use as needed.  I recommended she consider adding a supplement of vitamin D and magnesium to see if this helps.  - ZOLMitriptan (ZOMIG) 5 MG tablet; TAKE ONE-HALF (1/2) TO ONE FULL TABLET AS NEEDED FOR MIGRAINE. Maximum 2 doses in 24 hours.  Dispense: 12 tablet; Refill: 6    2. Insomnia  Information provided regarding sleep hygiene.  May use zolpidem sparingly as needed.  May try melatonin to see if this will help to reset her sleep schedule.    3. Impingement syndrome, shoulder, left  Reviewed nature condition.  Reviewed at home stretches and exercises.   Consider physical therapy.  NSAIDs, ice as needed.          Subjective:      Kaitlynn Ramirez is a 63 y.o. female presenting to clinic today with multiple concerns.  First she is needing refill on her zolmitriptan for migraine headaches, usually taking one half tablet at a time in the past, now requiring 1 full tab.  Headaches occur about  every 3 weeks and oftentimes the last about 3 days.  Some limitations in quantities in place her insurance.  Does not feel that her headache frequency is changed.  Quality has not changed either.  Zomig will work for most headaches, but requiring full tab.    Ongoing difficulties with onset insomnia.  Admits her exercise is inconsistent.  Does not feel that she is using caffeine inappropriately.  Using zolpidem on rare occasion, and walking regularly.    Interested in evaluation of pain her left arm, beginning in the lateral arm especially when picking up her purse.  Occurred after she had been in bed for a day and with a migraine headache.  No known injuries.  No weakness, numbness, tingling, or other dysfunction.  No prior history of injury.      Current Outpatient Prescriptions   Medication Sig Dispense Refill      amitriptyline (ELAVIL) 50 MG tablet Take 50 mg by mouth at bedtime.       amitriptyline (ELAVIL) 50 MG tablet TAKE 1 TABLET AT BEDTIME 90 tablet 3     atovaquone-proguanil (MALARONE) 250-100 mg Tab Take 1 tablet by mouth daily with breakfast. Begin taking 1-2 days prior to your travel,continue during travel and until 7 days after travel 22 tablet 0     azithromycin (ZITHROMAX) 250 MG tablet Take 2 tabs by mouth on day one, then one tab by mouth daily days two through five 6 tablet 0     calcium polycarbophil (FIBERCON) 625 mg tablet Take 1,250 mg by mouth daily.       cholecalciferol, vitamin D3, (VITAMIN D3) 1,000 unit capsule Take 1,000 Units by mouth daily.       LACTOBACILLUS ACIDOPHILUS (PROBIOTIC ORAL) Take 1 capsule by mouth daily.        multivitamin therapeutic (THERAGRAN) tablet Take 1 tablet by mouth daily.       omeprazole (PRILOSEC) 20 MG capsule Take 1 capsule (20 mg total) by mouth daily before breakfast. 30 capsule 0     typhoid (VIVOTIF) SR capsule Take 1 capsule by mouth every other day. 4 capsule 0     ZOLMitriptan (ZOMIG) 5 MG tablet TAKE ONE-HALF (1/2) TO ONE FULL TABLET AS NEEDED FOR MIGRAINE. Maximum 2 doses in 24 hours. 12 tablet 6     zolpidem (AMBIEN) 5 MG tablet Take 5-10 mg by mouth at bedtime as needed for sleep.       No current facility-administered medications for this visit.        Past Medical History, Family History, and Social History reviewed.  Past Medical History:   Diagnosis Date     Migraine      Past Surgical History:   Procedure Laterality Date     FOOT SURGERY Left 1994     RETINAL DETACHMENT SURGERY Left 11/2015     Review of patient's allergies indicates no known allergies.  Family History   Problem Relation Age of Onset     Kidney cancer Mother      Basal cell carcinoma Mother      No Medical Problems Sister      Basal cell carcinoma Brother      No Medical Problems Brother      No Medical Problems Brother      Breast cancer Neg Hx      Colon cancer Neg Hx      Prostate  "cancer Neg Hx      Ovarian cancer Neg Hx      Social History     Social History     Marital status:      Spouse name: N/A     Number of children: N/A     Years of education: N/A     Occupational History     Not on file.     Social History Main Topics     Smoking status: Never Smoker     Smokeless tobacco: Never Used     Alcohol use No      Comment: social and infrequent     Drug use: No     Sexual activity: Not on file     Other Topics Concern     Not on file     Social History Narrative         Review of systems is as stated in HPI, and the remainder of the 10 system review is otherwise negative.    Objective:     Vitals:    09/17/18 1016   BP: 110/80   Patient Site: Right Arm   Patient Position: Sitting   Cuff Size: Adult Large   Pulse: 77   Resp: 20   Temp: 98.1  F (36.7  C)   TempSrc: Oral   SpO2: 95%   Weight: 163 lb (73.9 kg)   Height: 5' 7.5\" (1.715 m)    Body mass index is 25.15 kg/(m^2).    Alert female.  Mucous membranes moist.  Pupils equal, round, and reactive to light.  Face moves symmetrically.  5 out of 5 strength in all 4 extremities.  Left shoulder is with mild reduction in range of motion, most apparent with external rotation and forward flexion.  5 out of 5 strength throughout.  No focal bony tenderness to palpation.  Piyush's positive      This note has been dictated using voice recognition software. Any grammatical or context distortions are unintentional and inherent to the the software.   "

## 2021-06-25 NOTE — PATIENT INSTRUCTIONS - HE
For your arm, begin either ibuprofen 600 mg 3 times daily with food or naproxen 500 mg twice daily with food, ice 3 times daily, and consider appointment with orthopedic surgery or physical therapy.    For your change in bowels, continue your probiotic, add Metamucil, and work to increase her intake of dietary fiber as well reducing dairy and sugars.  Notify me if lack of improvement or worsening.    Consider restarting amitriptyline to help with your sleep.  You can take it as needed or on a regular basis.  Work to make her sleep environment more comfortable so that he can sleep more soundly.  Consider seeing a therapist to work through some of the anxiety that is creating distraction for you.

## 2021-06-27 ENCOUNTER — HEALTH MAINTENANCE LETTER (OUTPATIENT)
Age: 66
End: 2021-06-27

## 2021-06-28 NOTE — PROGRESS NOTES
Progress Notes by Ayan Rae DO at 9/14/2019  9:10 AM     Author: Ayan Rae DO Service: -- Author Type: Physician    Filed: 9/16/2019  6:51 AM Encounter Date: 9/14/2019 Status: Signed    : Ayan Rae DO (Physician)       Chief Complaint   Patient presents with   ? itching     started 9/14/19, itching a lot, legs and neck itching, just finished antibiotics yesterday        History of Present Illness: Rooming staff notes reviewed. Patient was recently treated for what was suspected to be an infected insect bite in right lower leg. This condition seemed to improve temporarily, then the same area became more red, swollen, and itchy. She states she has bad reactions to mosquito bites, which she has several of throughout her body, to include her left lower leg where she has some smaller areas of erythema about 1 cm diameter. Her main concern is the larger area of redness in right lower leg.  No reported recent fevers or chills.    Review of systems: See history of present illness, all others negative.     Current Outpatient Medications   Medication Sig Dispense Refill   ? calcium polycarbophil (FIBERCON) 625 mg tablet Take 1,250 mg by mouth daily.     ? cholecalciferol, vitamin D3, (VITAMIN D3) 1,000 unit capsule Take 1,000 Units by mouth daily.     ? LACTOBACILLUS ACIDOPHILUS (PROBIOTIC ORAL) Take 1 capsule by mouth daily.      ? multivitamin therapeutic (THERAGRAN) tablet Take 1 tablet by mouth daily.     ? ZOLMitriptan (ZOMIG) 5 MG tablet TAKE ONE-HALF (1/2) TABLET AS NEEDED FOR MIGRAINE 6 tablet 3   ? zolpidem (AMBIEN) 5 MG tablet Take 5-10 mg by mouth at bedtime as needed for sleep.     ? cephalexin (KEFLEX) 500 MG capsule Take 1 capsule (500 mg total) by mouth 4 (four) times a day for 10 days. 40 capsule 0   ? hydrOXYzine pamoate (VISTARIL) 50 MG capsule Take 1 capsule (50 mg total) by mouth 4 (four) times a day as needed for itching. 20 capsule 0     No current facility-administered  medications for this visit.      Past Medical History:   Diagnosis Date   ? Migraine       Past Surgical History:   Procedure Laterality Date   ? FOOT SURGERY Left 1994   ? RETINAL DETACHMENT SURGERY Left 11/2015      Social History     Tobacco Use   ? Smoking status: Never Smoker   ? Smokeless tobacco: Never Used   Substance Use Topics   ? Alcohol use: No     Comment: social and infrequent   ? Drug use: No        Family History   Problem Relation Age of Onset   ? Kidney cancer Mother    ? Basal cell carcinoma Mother    ? No Medical Problems Sister    ? Basal cell carcinoma Brother    ? No Medical Problems Brother    ? No Medical Problems Brother    ? Breast cancer Neg Hx    ? Colon cancer Neg Hx    ? Prostate cancer Neg Hx    ? Ovarian cancer Neg Hx        Vitals:    09/14/19 0927   BP: 108/72   Pulse: 84   Resp: 16   Temp: 99.1  F (37.3  C)   TempSrc: Oral   SpO2: 97%   Weight: 172 lb (78 kg)       EXAM:   General: Vital signs reviewed.  Patient is in no acute appearing distress.  Breathing appears nonlabored.  Patient is alert and oriented ×3.    See picture in electronic medical record for reference.  The area of confluent erythema is warm to touch, and a little tender.  She has moderate edema from the mid shin region going distally along the lateral aspect of lower leg extending distally into the lateral right ankle.  No open skin areas, vesicles, or scabs noted.        I recommended to patient that we start treatment using a different antibiotic therapy.  She was agreeable to this idea, along with prescription of hydroxyzine for the itching.  I cautioned her not to use the hydroxyzine in conjunction with over-the-counter diphenhydramine.    Assessment/Plan   1. Cellulitis of right lower leg  cephalexin (KEFLEX) 500 MG capsule   2. Itching  hydrOXYzine pamoate (VISTARIL) 50 MG capsule       Patient Instructions   Caution that the hydroxyzine may cause drowsiness. Start the new antibiotic treatment today.      Patient Education     Discharge Instructions for Cellulitis  You have been diagnosed with cellulitis. This is an infection in the deepest layer of the skin and tissue beneath the skin. In some cases, the infection also affects the muscle. Cellulitis is caused by bacteria. The bacteria can enter the body through broken skin. This can happen with a cut, scratch, animal bite, or an insect bite that has been scratched. You may have been treated in the hospital with antibiotics and fluids. You will likely be given a prescription for antibiotics to take at home. This sheet will help you take care of yourself at home.  Home care  When you are home:    Take the prescribed antibiotic medicine you are given as directed until it is gone. Take it even if you feel better. It treats the infection and stops it from returning. Not taking all the medicine can make future infections hard to treat.    Keep the infected area clean.    When possible, raise the infected area above the level of your heart. This helps keep swelling down.    Talk with your healthcare provider if you are in pain. Ask what kind of over-the-counter medicine you can take for pain.    Apply clean bandages as advised.    Take your temperature once a day for a week.    Wash your hands often to prevent spreading the infection.  In the future, wash your hands before and after you touch cuts, scratches, or bandages. This will help prevent infection.   When to call your healthcare provider  Call your healthcare provider right away if you have any of the following:    Trouble or pain when moving the joints above or below the infected area    Discharge or pus draining from the area    Fever of 100.4 F (38 C) or higher, or as directed by your healthcare provider    Pain that gets worse in or around the infected     Redness that gets worse in or around the infected area, particularly if the area of redness expands to a wider area    Shaking chills    Swelling of the  infected area    Vomiting  Date Last Reviewed: 8/1/2016 2000-2019 [employer], [employer]. [address] [place] [address], [place] [address]. All rights reserved. This information is not intended as a substitute for professional medical care. Always follow your healthcare professional's instructions.              Ayan Rae DO

## 2021-07-13 ENCOUNTER — RECORDS - HEALTHEAST (OUTPATIENT)
Dept: ADMINISTRATIVE | Facility: CLINIC | Age: 66
End: 2021-07-13

## 2021-07-14 PROBLEM — J96.01 ACUTE RESPIRATORY FAILURE WITH HYPOXIA (H): Status: RESOLVED | Noted: 2017-07-06 | Resolved: 2018-09-17

## 2021-10-17 ENCOUNTER — HEALTH MAINTENANCE LETTER (OUTPATIENT)
Age: 66
End: 2021-10-17

## 2021-10-26 ENCOUNTER — MYC MEDICAL ADVICE (OUTPATIENT)
Dept: FAMILY MEDICINE | Facility: CLINIC | Age: 66
End: 2021-10-26

## 2021-12-20 ENCOUNTER — ANCILLARY PROCEDURE (OUTPATIENT)
Dept: MAMMOGRAPHY | Facility: CLINIC | Age: 66
End: 2021-12-20
Attending: FAMILY MEDICINE
Payer: MEDICARE

## 2021-12-20 DIAGNOSIS — Z12.31 VISIT FOR SCREENING MAMMOGRAM: ICD-10-CM

## 2021-12-20 PROCEDURE — 77063 BREAST TOMOSYNTHESIS BI: CPT

## 2021-12-21 ENCOUNTER — MYC MEDICAL ADVICE (OUTPATIENT)
Dept: FAMILY MEDICINE | Facility: CLINIC | Age: 66
End: 2021-12-21
Payer: MEDICARE

## 2021-12-21 DIAGNOSIS — G43.909 MIGRAINE WITHOUT STATUS MIGRAINOSUS, NOT INTRACTABLE, UNSPECIFIED MIGRAINE TYPE: ICD-10-CM

## 2021-12-21 RX ORDER — ZOLMITRIPTAN 5 MG/1
TABLET, FILM COATED ORAL
Qty: 36 TABLET | Refills: 4 | Status: SHIPPED | OUTPATIENT
Start: 2021-12-21 | End: 2022-04-06

## 2022-03-31 ASSESSMENT — ACTIVITIES OF DAILY LIVING (ADL): CURRENT_FUNCTION: NO ASSISTANCE NEEDED

## 2022-04-06 ENCOUNTER — OFFICE VISIT (OUTPATIENT)
Dept: FAMILY MEDICINE | Facility: CLINIC | Age: 67
End: 2022-04-06
Payer: MEDICARE

## 2022-04-06 VITALS
HEART RATE: 78 BPM | RESPIRATION RATE: 18 BRPM | OXYGEN SATURATION: 98 % | HEIGHT: 67 IN | TEMPERATURE: 98.2 F | WEIGHT: 175.5 LBS | BODY MASS INDEX: 27.55 KG/M2

## 2022-04-06 DIAGNOSIS — Z13.220 SCREENING FOR HYPERLIPIDEMIA: ICD-10-CM

## 2022-04-06 DIAGNOSIS — G47.00 INSOMNIA, UNSPECIFIED TYPE: ICD-10-CM

## 2022-04-06 DIAGNOSIS — M25.562 ACUTE PAIN OF LEFT KNEE: ICD-10-CM

## 2022-04-06 DIAGNOSIS — J30.9 ALLERGIC RHINITIS, UNSPECIFIED SEASONALITY, UNSPECIFIED TRIGGER: ICD-10-CM

## 2022-04-06 DIAGNOSIS — Z00.00 MEDICARE ANNUAL WELLNESS VISIT, SUBSEQUENT: Primary | ICD-10-CM

## 2022-04-06 DIAGNOSIS — Z11.59 NEED FOR HEPATITIS C SCREENING TEST: ICD-10-CM

## 2022-04-06 DIAGNOSIS — G43.909 MIGRAINE WITHOUT STATUS MIGRAINOSUS, NOT INTRACTABLE, UNSPECIFIED MIGRAINE TYPE: ICD-10-CM

## 2022-04-06 DIAGNOSIS — Z78.0 ASYMPTOMATIC POSTMENOPAUSAL STATUS: ICD-10-CM

## 2022-04-06 DIAGNOSIS — M79.674 PAIN OF TOE OF RIGHT FOOT: ICD-10-CM

## 2022-04-06 PROCEDURE — 90471 IMMUNIZATION ADMIN: CPT | Performed by: FAMILY MEDICINE

## 2022-04-06 PROCEDURE — 90732 PPSV23 VACC 2 YRS+ SUBQ/IM: CPT | Performed by: FAMILY MEDICINE

## 2022-04-06 PROCEDURE — G0438 PPPS, INITIAL VISIT: HCPCS | Performed by: FAMILY MEDICINE

## 2022-04-06 PROCEDURE — 99213 OFFICE O/P EST LOW 20 MIN: CPT | Mod: 25 | Performed by: FAMILY MEDICINE

## 2022-04-06 RX ORDER — ZOLMITRIPTAN 5 MG/1
TABLET, FILM COATED ORAL
Qty: 36 TABLET | Refills: 4 | Status: SHIPPED | OUTPATIENT
Start: 2022-04-06 | End: 2023-04-03

## 2022-04-06 ASSESSMENT — ACTIVITIES OF DAILY LIVING (ADL): CURRENT_FUNCTION: NO ASSISTANCE NEEDED

## 2022-04-06 NOTE — PROGRESS NOTES
"SUBJECTIVE:   Kaitlynn Ontiveros is a 66 year old female who presents for Preventive Visit.      Patient has been advised of split billing requirements and indicates understanding: Yes  Are you in the first 12 months of your Medicare coverage?  No    Noting that her right fifth digit intermittently becomes very sensitive and painful, never red or swollen, no known injuries.  Noting that 4 weeks ago she twisted her knee when she fell on her bottom on the ice while walking her dog.  Knee is longer tender no discomfort during the day but notes a little bit of discomfort on either side of her knee When she rolls over in bed.  No instability symptoms, swelling, or changes in appearance.    Continues to get migraine headaches about every 3 weeks or so, typically last 3 days, Zomig helpful.  No aura.  Seasonal allergies have been stable.  Insomnia stable.    We review immunizations today, she is agreeable to Pneumovax.  She is not interested in COVID second booster today, is considering doing so at a future visit.  She is not fasting today.    Knee Pain    Healthy Habits:     In general, how would you rate your overall health?  Good    Frequency of exercise:  None    Do you usually eat at least 4 servings of fruit and vegetables a day, include whole grains    & fiber and avoid regularly eating high fat or \"junk\" foods?  Yes    Taking medications regularly:  Yes    Medication side effects:  None    Ability to successfully perform activities of daily living:  No assistance needed    Home Safety:  No safety concerns identified    Hearing Impairment:  No hearing concerns    In the past 6 months, have you been bothered by leaking of urine? Yes    In general, how would you rate your overall mental or emotional health?  Good      PHQ-2 Total Score: 0    Additional concerns today:  No    Do you feel safe in your environment? Yes    Have you ever done Advance Care Planning? (For example, a Health Directive, POLST, or a " discussion with a medical provider or your loved ones about your wishes): No, advance care planning information given to patient to review.  Advanced care planning was discussed at today's visit.       Fall risk  Fallen 2 or more times in the past year?: No  Any fall with injury in the past year?: No    Cognitive Screening   1) Repeat 3 items (Leader, Season, Table)    2) Clock draw: NORMAL  3) 3 item recall: Recalls 1 object   Results: NORMAL clock, 1-2 items recalled: COGNITIVE IMPAIRMENT LESS LIKELY    Mini-CogTM Copyright EMY Lombardi. Licensed by the author for use in [facility]; reprinted with permission (christopher@[facility]). All rights reserved.        Reviewed and updated as needed this visit by clinical staff   Tobacco  Allergies  Meds              Reviewed and updated as needed this visit by Provider                 Social History     Tobacco Use     Smoking status: Never Smoker     Smokeless tobacco: Never Used   Substance Use Topics     Alcohol use: No     Comment: Alcoholic Drinks/day: once a month         Alcohol Use 3/31/2022   Prescreen: >3 drinks/day or >7 drinks/week? No             Current providers sharing in care for this patient include:   Patient Care Team:  Zeinab Oneal MD as PCP - General (Family Practice)  Zeinab Oneal MD as Assigned PCP    The following health maintenance items are reviewed in Epic and correct as of today:  Health Maintenance Due   Topic Date Due     DEXA  Never done     ANNUAL REVIEW OF HM ORDERS  Never done     ADVANCE CARE PLANNING  Never done     HEPATITIS C SCREENING  Never done     LIPID  Never done     MEDICARE ANNUAL WELLNESS VISIT  Never done     Pneumococcal Vaccine: 65+ Years (1 of 1 - PPSV23) Never done     FALL RISK ASSESSMENT  07/24/2021     Patient Active Problem List   Diagnosis     Allergic Rhinitis     Migraine Headache     Insomnia     Past Surgical History:   Procedure Laterality Date     CATARACT EXTRACTION Left      FOOT SURGERY Left  "1994     RETINAL DETACHMENT SURGERY Left 11/2015       Social History     Tobacco Use     Smoking status: Never Smoker     Smokeless tobacco: Never Used   Substance Use Topics     Alcohol use: No     Comment: Alcoholic Drinks/day: once a month     Family History   Problem Relation Age of Onset     Kidney Cancer Mother      Basal cell carcinoma Mother      No Known Problems Sister      Basal cell carcinoma Brother      No Known Problems Brother      No Known Problems Brother      Breast Cancer No family hx of      Colon Cancer No family hx of      Prostate Cancer No family hx of      Ovarian Cancer No family hx of      Clotting Disorder No family hx of          Current Outpatient Medications   Medication Sig Dispense Refill     cholecalciferol, vitamin D3, (VITAMIN D3) 1,000 unit capsule [CHOLECALCIFEROL, VITAMIN D3, (VITAMIN D3) 1,000 UNIT CAPSULE] Take 1,000 Units by mouth daily.       LACTOBACILLUS ACIDOPHILUS (PROBIOTIC ORAL) [LACTOBACILLUS ACIDOPHILUS (PROBIOTIC ORAL)] Take 1 capsule by mouth daily.        magnesium 30 mg tablet [MAGNESIUM 30 MG TABLET] Take 30 mg by mouth 2 (two) times a day.       multivitamin therapeutic (THERAGRAN) tablet [MULTIVITAMIN THERAPEUTIC (THERAGRAN) TABLET] Take 1 tablet by mouth daily.       ZOLMitriptan (ZOMIG) 5 MG tablet [ZOLMITRIPTAN (ZOMIG) 5 MG TABLET] TAKE ONE-HALF (1/2) TABLET AS NEEDED FOR MIGRAINE 36 tablet 4     No Known Allergies    Breast CA Risk Assessment (FHS-7) 3/31/2022   Do you have a family history of breast, colon, or ovarian cancer? No / Unknown       Mammogram Screening: Recommended mammography every 1-2 years with patient discussion and risk factor consideration  Pertinent mammograms are reviewed under the imaging tab.    Review of Systems  Constitutional, HEENT, cardiovascular, pulmonary, gi and gu systems are negative, except as otherwise noted.    OBJECTIVE:   Pulse 78   Temp 98.2  F (36.8  C) (Oral)   Resp 18   Ht 1.689 m (5' 6.5\")   Wt 79.6 kg (175 " "lb 8 oz)   SpO2 98%   BMI 27.90 kg/m   Estimated body mass index is 27.9 kg/m  as calculated from the following:    Height as of this encounter: 1.689 m (5' 6.5\").    Weight as of this encounter: 79.6 kg (175 lb 8 oz).  Physical Exam  GENERAL APPEARANCE: healthy, alert and no distress  EYES: Eyes grossly normal to inspection, PERRL and conjunctivae and sclerae normal  HENT: ear canals and TM's normal, nose and mouth without ulcers or lesions, oropharynx clear and oral mucous membranes moist  NECK: no adenopathy, no asymmetry, masses, or scars and thyroid normal to palpation  RESP: lungs clear to auscultation - no rales, rhonchi or wheezes  BREAST: normal without masses, tenderness or nipple discharge and no palpable axillary masses or adenopathy  CV: regular rate and rhythm, normal S1 S2, no S3 or S4, no murmur, click or rub, no peripheral edema and peripheral pulses strong  ABDOMEN: soft, nontender, no hepatosplenomegaly, no masses and bowel sounds normal  MS: no musculoskeletal defects are noted and gait is age appropriate without ataxia; left knee is with full range of motion.  No focal tenderness to palpation, edema, or effusion.  Normal stresses of the MCL and LCL.  Normal Lachman's and Marina's.  Right foot notable for small bunionette that is nontender and not site of pain.  Her right fourth toe tends to sit just underneath her fourth toe when relaxed, the medial edge of her fifth toenail is a bit irregular in shape as result.  She feels this is the primary site of her discomfort.  SKIN: no suspicious lesions or rashes  NEURO: Normal strength and tone, sensory exam grossly normal, mentation intact and speech normal  PSYCH: mentation appears normal and affect normal/bright        ASSESSMENT / PLAN:     Medicare annual wellness visit, subsequent  At today's visit, we discussed lifestyle interventions to promote self-management and wellness, including maintenance of a healthy weight, healthy diet, regular " physical activity and exercise, and falls prevention.  Order placed for screening bone density scan.  Will obtain future fasting lipids to screen for dyslipidemia, fasting glucose to screen for diabetes.  We discussed otitis C screening, she is agreeable to this at a future date.  She is up-to-date with mammogram screening and colonoscopy screening.  Information provided regarding advance healthcare directives.  - DEXA HIP/PELVIS/SPINE - Future; Future  - Glucose; Future    Migraine without status migrainosus, not intractable, unspecified migraine type  Continue magnesium supplement which has been helpful.  Continue Zomig as needed.  If neck tension seems to be triggering her migraines, we discussed potentially using cyclobenzaprine for this as well.    Allergic rhinitis, unspecified seasonality, unspecified trigger  Overall stable.    Insomnia, unspecified type  Stable.    Need for hepatitis C screening test  - Hepatitis C Screen Reflex to HCV RNA Quant and Genotype; Future    Screening for hyperlipidemia  - Lipid panel reflex to direct LDL Fasting; Future    Asymptomatic postmenopausal status  - DEXA HIP/PELVIS/SPINE - Future; Future     Right knee pain  Normal exam.  Suspect mild contusion that continues to heal.  May use symptomatic treatment such as consideration of a knee sleeve with sleep.  Notify with onset visual symptoms or failure to improve.    Right fifth toe pain  I suspect this is related to malpositioning and some toenail changes.  Advised monitoring of her toenail.  Could consider truss or similar to keep the toe from going underneath its neighbor.  Could consider podiatry evaluation if persisting.  Unlikely that her bunionette is causing her symptoms.    Patient has been advised of split billing requirements and indicates understanding: Yes    COUNSELING:  Reviewed preventive health counseling, as reflected in patient instructions    Estimated body mass index is 27.9 kg/m  as calculated from the  "following:    Height as of this encounter: 1.689 m (5' 6.5\").    Weight as of this encounter: 79.6 kg (175 lb 8 oz).    Weight management plan: Discussed healthy diet and exercise guidelines    She reports that she has never smoked. She has never used smokeless tobacco.      Appropriate preventive services were discussed with this patient, including applicable screening as appropriate for cardiovascular disease, diabetes, osteopenia/osteoporosis, and glaucoma.  As appropriate for age/gender, discussed screening for colorectal cancer, prostate cancer, breast cancer, and cervical cancer. Checklist reviewing preventive services available has been given to the patient.    Reviewed patients plan of care and provided an AVS. The Basic Care Plan (routine screening as documented in Health Maintenance) for Kaitlynn meets the Care Plan requirement. This Care Plan has been established and reviewed with the Patient.    Counseling Resources:  ATP IV Guidelines  Pooled Cohorts Equation Calculator  Breast Cancer Risk Calculator  Breast Cancer: Medication to Reduce Risk  FRAX Risk Assessment  ICSI Preventive Guidelines  Dietary Guidelines for Americans, 2010  [employer]s MyPlate  ASA Prophylaxis  Lung CA Screening    Zeinab Oneal MD  [facility]    Identified Health Risks:    She is at risk for lack of exercise and has been provided with information to increase physical activity for the benefit of her well-being.  Information on urinary incontinence and treatment options given to patient.  "

## 2022-04-06 NOTE — PATIENT INSTRUCTIONS
Patient Education   Personalized Prevention Plan  You are due for the preventive services outlined below.  Your care team is available to assist you in scheduling these services.  If you have already completed any of these items, please share that information with your care team to update in your medical record.  Health Maintenance Due   Topic Date Due     Osteoporosis Screening  Never done     Discuss Advance Care Planning  Never done     Hepatitis C Screening  Never done     Cholesterol Lab  Never done     Pneumococcal Vaccine (1 of 1 - PPSV23) Never done     FALL RISK ASSESSMENT  07/24/2021       Exercise for a Healthier Heart  You may wonder how you can improve the health of your heart. If you re thinking about exercise, you re on the right track. You don t need to become an athlete. But you do need a certain amount of brisk exercise to help strengthen your heart. If you have been diagnosed with a heart condition, your healthcare provider may advise exercise to help stabilize your condition. To help make exercise a habit, choose safe, fun activities.      Exercise with a friend. When activity is fun, you're more likely to stick with it.   Before you start  Check with your healthcare provider before starting an exercise program. This is especially important if you have not been active for a while. It's also important if you have a long-term (chronic) health problem such as heart disease, diabetes, or obesity. Or if you are at high risk for having these problems.   Why exercise?  Exercising regularly offers many healthy rewards. It can help you do all of the following:     Improve your blood cholesterol level to help prevent further heart trouble    Lower your blood pressure to help prevent a stroke or heart attack    Control diabetes, or reduce your risk of getting this disease    Improve your heart and lung function    Reach and stay at a healthy weight    Make your muscles stronger so you can stay  active    Prevent falls and fractures by slowing the loss of bone mass (osteoporosis)    Manage stress better    Reduce your blood pressure    Improve your sense of self and your body image  Exercise tips      Ease into your routine. Set small goals. Then build on them. If you are not sure what your activity level should be, talk with your healthcare provider first before starting an exercise routine.    Exercise on most days. Aim for a total of 150 minutes (2 hours and 30 minutes) or more of moderate-intensity aerobic activity each week. Or 75 minutes (1 hour and 15 minutes) or more of vigorous-intensity aerobic activity each week. Or try for a combination of both. Moderate activity means that you breathe heavier and your heart rate increases but you can still talk. Think about doing 40 minutes of moderate exercise, 3 to 4 times a week. For best results, activity should last for about 40 minutes to lower blood pressure and cholesterol. It's OK to work up to the 40-minute period over time. Examples of moderate-intensity activity are walking 1 mile in 15 minutes. Or doing 30 to 45 minutes of yard work.    Step up your daily activity level.  Along with your exercise program, try being more active the whole day. Walk instead of drive. Or park further away so that you take more steps each day. Do more household tasks or yard work. You may not be able to meet the advised mount of physical activity. But doing some moderate- or vigorous-intensity aerobic activity can help reduce your risk for heart disease. Your healthcare provider can help you figure out what is best for you.    Choose 1 or more activities you enjoy.  Walking is one of the easiest things you can do. You can also try swimming, riding a bike, dancing, or taking an exercise class.    When to call your healthcare provider  Call your healthcare provider if you have any of these:     Chest pain or feel dizzy or lightheaded    Burning, tightness, pressure, or  heaviness in your chest, neck, shoulders, back, or arms    Abnormal shortness of breath    More joint or muscle pain    A very fast or irregular heartbeat (palpitations)  [employer] last reviewed this educational content on 7/1/2019 2000-2021 [employer]. All rights reserved. This information is not intended as a substitute for professional medical care. Always follow your healthcare professional's instructions.          Urinary Incontinence, Female (Adult)   Urinary incontinence means loss of bladder control. This problem affects many women, especially as they get older. If you have incontinence, you may be embarrassed to ask for help. But know that this problem can be treated.   Types of Incontinence  There are different types of incontinence. Two of the main types are described here. You can have more than one type.     Stress incontinence. With this type, urine leaks when pressure (stress) is put on the bladder. This may happen when you cough, sneeze, or laugh. Stress incontinence most often occurs because the pelvic floor muscles that support the bladder and urethra are weak. This can happen after pregnancy and vaginal childbirth or a hysterectomy. It can also be due to excess body weight or hormone changes.    Urge incontinence (also called overactive bladder). With this type, a sudden urge to urinate is felt often. This may happen even though there may not be much urine in the bladder. The need to urinate often during the night is common. Urge incontinence most often occurs because of bladder spasms. This may be due to bladder irritation or infection. Damage to bladder nerves or pelvic muscles, constipation, and certain medicines can also lead to urge incontinence.  Treatment depends on the cause. Further evaluation is needed to find the type you have. This will likely include an exam and certain tests. Based on the results, you and your healthcare provider can then plan treatment. Until a  diagnosis is made, the home care tips below can help ease symptoms.   Home care    Do pelvic floor muscle exercises, if they are prescribed. The pelvic floor muscles help support the bladder and urethra. Many women find that their symptoms improve when doing special exercises that strengthen these muscles. To do the exercises, contract the muscles you would use to stop your stream of urine. But do this when you re not urinating. Hold for 10 seconds, then relax. Repeat 10 to 20 times in a row, at least 3 times a day. Your healthcare provider may give you other instructions for how to do the exercises and how often.    Keep a bladder diary. This helps track how often and how much you urinate over a set period of time. Bring this diary with you to your next visit with the provider. The information can help your provider learn more about your bladder problem.    Lose weight, if advised to by your provider. Extra weight puts pressure on the bladder. Your provider can help you create a weight-loss plan that s right for you. This may include exercising more and making certain diet changes.    Don't have foods and drinks that may irritate the bladder. These can include alcohol and caffeinated drinks.    Quit smoking. Smoking and other tobacco use can lead to a long-term (chronic) cough that strains the pelvic floor muscles. Smoking may also damage the bladder and urethra. Talk with your provider about treatments or methods you can use to quit smoking.    If drinking large amounts of fluid makes you have symptoms, you may be advised to limit your fluid intake. You may also be advised to drink most of your fluids during the day and to limit fluids at night.    If you re worried about urine leakage or accidents, you may wear absorbent pads to catch urine. Change the pads often. This helps reduce discomfort. It may also reduce the risk of skin or bladder infections.    Follow-up care  Follow up with your healthcare provider, or  as directed. It may take some to find the right treatment for your problem. But healthy lifestyle changes can be made right away. These include such things as exercising on a regular basis, eating a healthy diet, losing weight (if needed), and quitting smoking. Your treatment plan may include special therapies or medicines. Certain procedures or surgery may also be options. Talk about any questions you have with your provider.   When to seek medical advice  Call the healthcare provider right away if any of these occur:    Fever of 100.4 F (38 C) or higher, or as directed by your provider    Bladder pain or fullness    Belly swelling    Nausea or vomiting    Back pain    Weakness, dizziness, or fainting  [employer] last reviewed this educational content on 1/1/2020 2000-2021 [employer]. All rights reserved. This information is not intended as a substitute for professional medical care. Always follow your healthcare professional's instructions.

## 2022-04-26 ENCOUNTER — ANCILLARY PROCEDURE (OUTPATIENT)
Dept: BONE DENSITY | Facility: CLINIC | Age: 67
End: 2022-04-26
Attending: FAMILY MEDICINE
Payer: MEDICARE

## 2022-04-26 DIAGNOSIS — Z00.00 MEDICARE ANNUAL WELLNESS VISIT, SUBSEQUENT: ICD-10-CM

## 2022-04-26 DIAGNOSIS — Z78.0 ASYMPTOMATIC POSTMENOPAUSAL STATUS: ICD-10-CM

## 2022-04-26 PROCEDURE — 77080 DXA BONE DENSITY AXIAL: CPT | Mod: TC | Performed by: RADIOLOGY

## 2022-06-27 ENCOUNTER — LAB (OUTPATIENT)
Dept: LAB | Facility: CLINIC | Age: 67
End: 2022-06-27
Payer: MEDICARE

## 2022-06-27 DIAGNOSIS — Z13.220 SCREENING FOR HYPERLIPIDEMIA: ICD-10-CM

## 2022-06-27 DIAGNOSIS — Z00.00 MEDICARE ANNUAL WELLNESS VISIT, SUBSEQUENT: ICD-10-CM

## 2022-06-27 DIAGNOSIS — Z11.59 NEED FOR HEPATITIS C SCREENING TEST: ICD-10-CM

## 2022-06-27 LAB
CHOLEST SERPL-MCNC: 191 MG/DL
FASTING STATUS PATIENT QL REPORTED: YES
FASTING STATUS PATIENT QL REPORTED: YES
GLUCOSE BLD-MCNC: 83 MG/DL (ref 70–125)
HCV AB SERPL QL IA: NONREACTIVE
HDLC SERPL-MCNC: 59 MG/DL
LDLC SERPL CALC-MCNC: 99 MG/DL
TRIGL SERPL-MCNC: 163 MG/DL

## 2022-06-27 PROCEDURE — 86803 HEPATITIS C AB TEST: CPT

## 2022-06-27 PROCEDURE — 82947 ASSAY GLUCOSE BLOOD QUANT: CPT

## 2022-06-27 PROCEDURE — 36415 COLL VENOUS BLD VENIPUNCTURE: CPT

## 2022-06-27 PROCEDURE — 80061 LIPID PANEL: CPT

## 2022-08-11 ENCOUNTER — NURSE TRIAGE (OUTPATIENT)
Dept: NURSING | Facility: CLINIC | Age: 67
End: 2022-08-11

## 2022-08-11 NOTE — TELEPHONE ENCOUNTER
"Nurse Triage SBAR    Situation: Insect bite or sting    Background: Patient calling. Yesterday was outside and there was a lot of bugs and she was stung or bitten.    Assessment: Right hand sting. Spreading redness and warm, itchy, about 2 inches. Slight sore throat. Temp: 96.4. Area feels \"tight\". Swelling to the area that is red. States it is uncomfortable but not painful. Tried hydrocortisone cream.     Protocol Recommended Disposition: Home care/ Telephone Advise    Recommendation: According to the protocol, Patient should do home care. Home care reviewed. Advised Patient to do home care. Home care reviewed. Care advice given. Patient verbalizes understanding and agrees with plan of care. Reviewed concerning symptoms and when to call back.     Vanesa Delgado RN Nursing Advisor 8/11/2022 4:36 PM     Reason for Disposition    Normal local reaction to bee, wasp, or yellow jacket sting    Not a bee, wasp, hornet, or yellow jacket sting    Itchy insect bite    Additional Information    Negative: [1] Life-threatening reaction (anaphylaxis) in the past to sting AND [2] < 2 hours since sting    Negative: Attacked by swarm of bees now    Negative: Passed out (i.e., lost consciousness, collapsed and was not responding)    Negative: Wheezing, stridor, or difficulty breathing    Negative: [1] Hoarseness or cough AND [2] sudden onset following sting    Negative: [1] Tightness in the throat or chest AND [2] sudden onset following sting    Negative: [1] Swollen tongue or difficulty swallowing AND [2] sudden onset following sting    Negative: Sounds like a life-threatening emergency to the triager    Negative: [1] Hives, itching, or swelling elsewhere on body (i.e., not a site of sting) AND [2] started within 2 hours of sting (Exception: only at site of sting)    Negative: [1] Vomiting or abdominal cramps AND [2] started within 2 hours of sting    Negative: [1] Gave epinephrine shot AND [2] no symptoms now    Negative: Ring " stuck on swollen finger (or toe) following bee sting    Negative: Sting inside the mouth    Negative: Sting on eyeball (e.g., cornea)    Negative: Patient sounds very sick or weak to the triager    Negative: More than 50 stings    Negative: [1] Fever AND [2] red area    Negative: [1] Fever AND [2] area is very tender to touch    Negative: [1] Red streak or red line AND [2] length > 2 inches (5 cm)    Negative: [1] Red or very tender (to touch) area AND [2] started over 24 hours after the sting    Negative: [1] Red or very tender (to touch) area AND [2] getting larger over 48 hours after the sting    Negative: Swelling is huge (e.g., more than 4 inches or 10 cm, spreads beyond wrist or ankle)    Negative: [1] Hives, itching, or swelling elsewhere on body (i.e., not a site of stings) AND [2] started over 2 hours after sting  (Exception: only at site of sting)    Negative: [1] Scab is present AND [2] it drains pus or increases in size AND [3] not improved after applying  antibiotic ointment for 2 days    Negative: [1] Life-threatening reaction (anaphylaxis) in the past to bite from same insect AND [2] < 2 hours since bite    Negative: Passed out (i.e., lost consciousness, collapsed and was not responding)    Negative: Difficulty breathing or wheezing    Negative: [1] Hoarseness or cough AND [2] sudden onset following bite    Negative: [1] Difficulty swallowing or slurred speech AND [2] sudden onset following bite    Negative: Sounds like a life-threatening emergency to the triager    Negative: Patient sounds very sick or weak to the triager    Negative: [1] SEVERE bite pain AND [2] not improved after 2 hours of pain medicine    Negative: [1] Fever AND [2] red area    Negative: [1] Fever AND [2] area is very tender to touch    Negative: [1] Red streak or red line AND [2] length > 2 inches (5 cm)    Negative: [1] Red or very tender (to touch) area AND [2] started over 24 hours after the bite    Negative: [1] Red or very  tender (to touch) area AND [2] getting larger over 48 hours after the bite    Negative: No prior tetanus shots (or is not fully vaccinated)    Negative: [1] SEVERE local itching (i.e., interferes with work, school, sleep) AND [2] not improved after 24 hours of hydrocortisone cream    Negative: Bite starts to look bad (e.g., blister, purplish skin, ulcer)  (Exception: There is just minor swelling or small red bump.)    Negative: [1] Scab is present AND [2] it drains pus or increases in size AND [3] not improved after applying antibiotic ointment for 2 days    Negative: [1] After 14 days AND [2] insect bite isn't healed    Negative: Last tetanus shot > 10 years ago    Protocols used: BEE OR YELLOW JACKET STING-A-, INSECT BITE-A-AH

## 2022-10-01 ENCOUNTER — HEALTH MAINTENANCE LETTER (OUTPATIENT)
Age: 67
End: 2022-10-01

## 2022-12-22 ENCOUNTER — ANCILLARY PROCEDURE (OUTPATIENT)
Dept: MAMMOGRAPHY | Facility: CLINIC | Age: 67
End: 2022-12-22
Attending: FAMILY MEDICINE
Payer: MEDICARE

## 2022-12-22 DIAGNOSIS — Z12.31 VISIT FOR SCREENING MAMMOGRAM: ICD-10-CM

## 2022-12-22 PROCEDURE — 77067 SCR MAMMO BI INCL CAD: CPT

## 2022-12-26 DIAGNOSIS — Z71.84 TRAVEL ADVICE ENCOUNTER: Primary | ICD-10-CM

## 2022-12-26 RX ORDER — CIPROFLOXACIN 500 MG/1
TABLET, FILM COATED ORAL
Qty: 10 TABLET | Refills: 0 | Status: SHIPPED | OUTPATIENT
Start: 2022-12-26 | End: 2023-04-03

## 2022-12-26 RX ORDER — ATOVAQUONE AND PROGUANIL HYDROCHLORIDE 250; 100 MG/1; MG/1
1 TABLET, FILM COATED ORAL DAILY
Qty: 12 TABLET | Refills: 0 | Status: SHIPPED | OUTPATIENT
Start: 2022-12-26 | End: 2023-04-03

## 2023-04-02 ENCOUNTER — TELEPHONE (OUTPATIENT)
Dept: FAMILY MEDICINE | Facility: CLINIC | Age: 68
End: 2023-04-02
Payer: COMMERCIAL

## 2023-04-02 ENCOUNTER — NURSE TRIAGE (OUTPATIENT)
Dept: NURSING | Facility: CLINIC | Age: 68
End: 2023-04-02
Payer: COMMERCIAL

## 2023-04-02 NOTE — TELEPHONE ENCOUNTER
Fide Painter RN on 4/2/2023 at 5:29 PM      Reason for Disposition    General information question, no triage required and triager able to answer question    Protocols used: INFORMATION ONLY CALL - NO TRIAGE-A-

## 2023-04-02 NOTE — TELEPHONE ENCOUNTER
Monday will be day 2.  Please consider for antiviral treatment.    ZULY KEY RN on 4/2/2023 at 11:34 AM

## 2023-04-02 NOTE — TELEPHONE ENCOUNTER
COVID Positive/Requesting COVID treatment    Patient is positive for COVID and requesting treatment options.    Date of positive COVID test (PCR or at home)? 4/2  Current COVID symptoms: fever or chills, cough, fatigue, muscle or body aches, headache, sore throat and congestion or runny nose  Date COVID symptoms began: 4/1    Message should be routed to clinic RN pool. Best phone number to use for call back: [phone]

## 2023-04-03 ENCOUNTER — TELEPHONE (OUTPATIENT)
Dept: FAMILY MEDICINE | Facility: CLINIC | Age: 68
End: 2023-04-03

## 2023-04-03 ENCOUNTER — VIRTUAL VISIT (OUTPATIENT)
Dept: FAMILY MEDICINE | Facility: CLINIC | Age: 68
End: 2023-04-03
Payer: COMMERCIAL

## 2023-04-03 DIAGNOSIS — G43.909 MIGRAINE WITHOUT STATUS MIGRAINOSUS, NOT INTRACTABLE, UNSPECIFIED MIGRAINE TYPE: ICD-10-CM

## 2023-04-03 DIAGNOSIS — U07.1 INFECTION DUE TO 2019 NOVEL CORONAVIRUS: Primary | ICD-10-CM

## 2023-04-03 PROCEDURE — 99213 OFFICE O/P EST LOW 20 MIN: CPT | Mod: CS | Performed by: STUDENT IN AN ORGANIZED HEALTH CARE EDUCATION/TRAINING PROGRAM

## 2023-04-03 RX ORDER — ZOLMITRIPTAN 5 MG/1
TABLET, FILM COATED ORAL
Qty: 36 TABLET | Refills: 4 | Status: SHIPPED | OUTPATIENT
Start: 2023-04-03 | End: 2023-09-19

## 2023-04-03 NOTE — PROGRESS NOTES
Assessment & Plan     1. Migraine without status migrainosus, not intractable, unspecified migraine type  - ZOLMitriptan (ZOMIG) 5 MG tablet; [ZOLMITRIPTAN (ZOMIG) 5 MG TABLET] TAKE ONE-HALF (1/2) TABLET AS NEEDED FOR MIGRAINE  Dispense: 36 tablet; Refill: 4    2. Infection due to 2019 novel coronavirus  - nirmatrelvir and ritonavir (PAXLOVID) 300 mg/100 mg therapy pack; Take 3 tablets by mouth 2 times daily for 5 days  Dispense: 30 tablet; Refill: 0    Subjective   Chief Complaint   Patient presents with     Recheck Medication     Covid Concern     Positive on 4/2       HPI   Patient tells me that her  tested positive for COVID last week and he was isolating but unfortunately she got COVID from him. Saturday she started having symptoms of headache, fatigue, congestion, cough. She tested for COVID and was positive. This was 2 days ago. She is having persistent symptoms. No Hx of renal dysfunction. No medication interreactions.    Objective      Vitals:  No vitals were obtained today due to virtual visit.    Physical Exam   GENERAL: Healthy, alert and no distress  EYES: Eyes grossly normal to inspection.  No discharge or erythema, or obvious scleral/conjunctival abnormalities.  RESP: No audible wheeze, cough, or visible cyanosis.  No visible retractions or increased work of breathing.    SKIN: Visible skin clear. No significant rash, abnormal pigmentation or lesions.  NEURO: Cranial nerves grossly intact.  Mentation and speech appropriate for age.  PSYCH: Mentation appears normal, affect normal/bright, judgement and insight intact, normal speech and appearance well-groomed.      Video-Visit Details    Type of service:  Video Visit     Start time: 9:16 AM  End time: 9:27 AM    Originating Location (pt. Location): Home  Distant Location (provider location):  On-site  Platform used for Video Visit: [employer]

## 2023-04-03 NOTE — TELEPHONE ENCOUNTER
Patient was already seen earlier today by provider and is receiving Paxlovid for COVID-19 Tx toay. Closing out duplicate encounter.    Christophe Nuñez, GENEN, RN  [facility]

## 2023-04-20 ENCOUNTER — PATIENT OUTREACH (OUTPATIENT)
Dept: CARE COORDINATION | Facility: CLINIC | Age: 68
End: 2023-04-20
Payer: COMMERCIAL

## 2023-05-14 ENCOUNTER — HEALTH MAINTENANCE LETTER (OUTPATIENT)
Age: 68
End: 2023-05-14

## 2023-09-04 ENCOUNTER — OFFICE VISIT (OUTPATIENT)
Dept: FAMILY MEDICINE | Facility: CLINIC | Age: 68
End: 2023-09-04
Payer: COMMERCIAL

## 2023-09-04 VITALS
RESPIRATION RATE: 16 BRPM | HEART RATE: 70 BPM | TEMPERATURE: 98.8 F | DIASTOLIC BLOOD PRESSURE: 88 MMHG | SYSTOLIC BLOOD PRESSURE: 148 MMHG | OXYGEN SATURATION: 96 %

## 2023-09-04 DIAGNOSIS — N39.0 ACUTE UTI: Primary | ICD-10-CM

## 2023-09-04 LAB
ALBUMIN UR-MCNC: 100 MG/DL
APPEARANCE UR: CLEAR
BACTERIA #/AREA URNS HPF: ABNORMAL /HPF
BILIRUB UR QL STRIP: NEGATIVE
COLOR UR AUTO: YELLOW
GLUCOSE UR STRIP-MCNC: NEGATIVE MG/DL
HGB UR QL STRIP: ABNORMAL
KETONES UR STRIP-MCNC: NEGATIVE MG/DL
LEUKOCYTE ESTERASE UR QL STRIP: ABNORMAL
NITRATE UR QL: NEGATIVE
PH UR STRIP: 6 [PH] (ref 5–8)
RBC #/AREA URNS AUTO: ABNORMAL /HPF
SP GR UR STRIP: 1.01 (ref 1–1.03)
SQUAMOUS #/AREA URNS AUTO: ABNORMAL /LPF
UROBILINOGEN UR STRIP-ACNC: 0.2 E.U./DL
WBC #/AREA URNS AUTO: ABNORMAL /HPF
WBC CLUMPS #/AREA URNS HPF: PRESENT /HPF

## 2023-09-04 PROCEDURE — 87186 SC STD MICRODIL/AGAR DIL: CPT

## 2023-09-04 PROCEDURE — 81001 URINALYSIS AUTO W/SCOPE: CPT

## 2023-09-04 PROCEDURE — 87086 URINE CULTURE/COLONY COUNT: CPT

## 2023-09-04 PROCEDURE — 99213 OFFICE O/P EST LOW 20 MIN: CPT

## 2023-09-04 RX ORDER — NITROFURANTOIN 25; 75 MG/1; MG/1
100 CAPSULE ORAL 2 TIMES DAILY
Qty: 10 CAPSULE | Refills: 0 | Status: SHIPPED | OUTPATIENT
Start: 2023-09-04 | End: 2023-09-09

## 2023-09-04 NOTE — PROGRESS NOTES
URGENT CARE  Assessment & Plan   Assessment:   Kaitlynn Ontiveros is a 68 year old female who's clinical presentation today is consistent with:   1. Acute UTI  - UA Macroscopic with reflex to Microscopic and Culture -  - nitroFURantoin macrocrystal-monohydrate (MACROBID) 100 MG capsule;  Plan:  Will treat patient's acute uncomplicated cystitis, with antibiotics at this time, culture pending and will call if a different antibiotic is needed   Educated patient that for symptomatic relief she my use Azo (Phenazopyridine) as needed, side effects of medications reviewed.   additionally encouraged patient to increase fluid intake, practice good hygiene (wiping front to back, voiding after sexual intercourse), and avoidance of deodorant sprays.   Additionally we discussed if symptoms do not improve after starting today's treatment (or if symptoms worsen) to follow up in 5-7 days.  No alarm signs or symptoms present   Differential Diagnoses for this patient's chief complaint that I considered include:  Bacterial vaginosis, candidiasis, Vulvovaginitis, atrophic vaginitis, Overactive bladder, urethritis, interstitial cystitis, urethral irritation,  Pyelonephritis, nephrolithiasis, Pelvic organ (uterine/bladder) prolapse, Foreign body in bladder,  Atypical etiology of cystitis: fungal, viral, contact vs allergic vaginitis,  Malignancy (vaginal, ovarian, cervical)     Patient is agreeable to treatment plan and state they will follow-up if symptoms do not improve and/or if symptoms worsen   see patient's AVS 'monitor for' section for specific patient instructions given and discussed regarding what to watch for and when to follow up    [facility]GGY [facility]      ______________________________________________________________________      Subjective     HPI: Kaitlynn Ontiveros  is a 68 year old  female who presents today for evaluation the following concerns:   The patient presents today  complaining of dysuria and burning  for 2 days which started on 9/2/23   Patient  denies} blood in urine, pelvic pain, and sensation of incomplete bladder emptying  Patient endorses having a past history of one previous urinary tract infection   Patient denies back pain/flank pain, fever, chills, or any abnormal vaginal discharge/odor or bleeding.    Review of Systems:  Pertinent review of systems as reflected in HPI, otherwise negative.     Objective    Physical Exam:  Vitals:    09/04/23 1224   BP: (!) 148/88   Pulse: 70   Resp: 16   Temp: 98.8  F (37.1  C)   TempSrc: Oral   SpO2: 96%      General: Alert and oriented, no acute distress, afebrile, normotensive  Psy/mental status: Nonanxious, cooperative  SKIN: Intact, no open areas  ABDOMEN:  soft, non-tender, non-distended    No flank pain or CVA tenderness to palpation bilaterally  Pelvic/ :  Deferred per patient       LABS:   Results for orders placed or performed in visit on 09/04/23   UA Macroscopic with reflex to Microscopic and Culture - Clinic Collect     Status: Abnormal    Specimen: Urine, Clean Catch   Result Value Ref Range    Color Urine Yellow Colorless, Straw, Light Yellow, Yellow    Appearance Urine Clear Clear    Glucose Urine Negative Negative mg/dL    Bilirubin Urine Negative Negative    Ketones Urine Negative Negative mg/dL    Specific Gravity Urine 1.010 1.005 - 1.030    Blood Urine Moderate (A) Negative    pH Urine 6.0 5.0 - 8.0    Protein Albumin Urine 100 (A) Negative mg/dL    Urobilinogen Urine 0.2 0.2, 1.0 E.U./dL    Nitrite Urine Negative Negative    Leukocyte Esterase Urine Large (A) Negative   Urine Microscopic Exam     Status: Abnormal   Result Value Ref Range    Bacteria Urine Few (A) None Seen /HPF    RBC Urine 5-10 (A) 0-2 /HPF /HPF    WBC Urine 25-50 (A) 0-5 /HPF /HPF    Squamous Epithelials Urine None Seen None Seen /LPF    WBC Clumps Urine Present (A) None Seen /HPF         ______________________________________________________________________    I explained my diagnostic considerations and recommendations to the patient, who voiced understanding and agreement with the treatment plan.   All questions were answered.   We discussed potential side effects, risks and benefits of any prescribed or recommended therapies, as well as expectations for response to treatments.  Please see AVS for any patient instructions & handouts given.   Patient was advised to contact the Nurse Care Line, their Primary Care provider, Urgent Care, or the Emergency Department if there are new or worsening symptoms, or call 911 for emergencies.

## 2023-09-04 NOTE — PATIENT INSTRUCTIONS
Diagnosis: UTI (urinary tract infection)     Plan:   Lab results today were positive for urinary tract infection.   Urine culture will becompleted and you  will only receive a phone call if antibiotic treatment needs to be adjusted  Start antibiotics today, take full course   Monitor GI distress  Consider a probiotic, kefir, or yogurt with live active cultures to replace good bacteria in the gastrointestinal tract.  Increase fluids   Can try Aso or phridum to help reduce pain   Pyridium will turn your urine orange and may stain your clothing.  Other   Avoid items that can irritate the bladder: caffeine, alcohol, spicy food, nicotine, carbonated drinks, and artificial sweeteners  Empty bladder frequently even if small amounts, helps to remove bacteria   For recurrent urinary tract problems a PCP or a urologist referral is warranted and this health care providers may prescribe prophylactic antibiotics to be taken daily or after intercourse to prevent cystitis from recurring}       Monitor for:   New Fevers  Symptoms are not getting better   Pain in the belly (abdomen) area below the bellybutton,  Low back back pain, on the sides, below the ribs- flanks   Nausea or vomiting  Unable to control bladder   Feeling confused, lightheaded or dizzy         UTI urinary tract infection, bladder infection   A bladder infection, also called cystitis, or urinary tract infection   Is where the inner lining of the bladder becomes inflamed (red and swollen) and the urine is full of bacteria.   It happens when bacteria travel up the urethra and into the bladder, usually from stool contact   Women are more likely to have bladder infections than men because their urethra is shorter.   The short urethra makes it easier for bacteria from the anus or genital area to reach the bladder.   This can happen during such activities as wiping or sexual intercourse. Most infections of the urinary tract are caused this way.   Bladder infections often  occur in young women who have just become sexually active and have sexual intercourse often.   Symptoms: a frequent and urgent need to urinate, a burning, stinging, or pressure sensation during urination   a crampy pain or discomfort in the lower abdomen just above the pubic bone or sometimes in the lower back   a need to urinate more often in the night, cloudy urine that smells bad, blood in the urine, leaking of urine, fever and occasionally chills.   Prevent  To help prevent a bladder infection from recurring:  -urinate often during the day, and empty your bladder completely each time.   In addition women should follow these guidelines:   - Keep the vaginal area clean.   - Wipe from front to back after a bowel movement.   - Be sure to wash the genital area each time you bathe or shower.   - However, use soap only on the outside of your vagina.   - The chemicals in soap may cause additional irritation.   - Urinate after intercourse. Never combine anal and vaginal intercourse.   - Wear cotton underwear, which allows better air circulation than nylon.    - Avoid tight clothes in the genital area, such as control-top pantyhose and tight jeans.   - Do not wear a wet bathing suit for long periods of time.

## 2023-09-05 LAB — BACTERIA UR CULT: ABNORMAL

## 2023-09-19 DIAGNOSIS — G43.909 MIGRAINE WITHOUT STATUS MIGRAINOSUS, NOT INTRACTABLE, UNSPECIFIED MIGRAINE TYPE: ICD-10-CM

## 2023-09-19 RX ORDER — ZOLMITRIPTAN 5 MG/1
TABLET, FILM COATED ORAL
Qty: 36 TABLET | Refills: 4 | Status: SHIPPED | OUTPATIENT
Start: 2023-09-19

## 2023-12-26 ENCOUNTER — ANCILLARY PROCEDURE (OUTPATIENT)
Dept: MAMMOGRAPHY | Facility: HOSPITAL | Age: 68
End: 2023-12-26
Attending: FAMILY MEDICINE
Payer: COMMERCIAL

## 2023-12-26 DIAGNOSIS — Z12.31 VISIT FOR SCREENING MAMMOGRAM: ICD-10-CM

## 2023-12-26 PROCEDURE — 77067 SCR MAMMO BI INCL CAD: CPT

## 2024-02-15 ENCOUNTER — OFFICE VISIT (OUTPATIENT)
Dept: FAMILY MEDICINE | Facility: CLINIC | Age: 69
End: 2024-02-15
Payer: COMMERCIAL

## 2024-02-15 VITALS
HEART RATE: 70 BPM | BODY MASS INDEX: 28.08 KG/M2 | SYSTOLIC BLOOD PRESSURE: 114 MMHG | OXYGEN SATURATION: 98 % | WEIGHT: 178.9 LBS | RESPIRATION RATE: 18 BRPM | DIASTOLIC BLOOD PRESSURE: 80 MMHG | HEIGHT: 67 IN | TEMPERATURE: 97.8 F

## 2024-02-15 DIAGNOSIS — R58 BLEEDING: ICD-10-CM

## 2024-02-15 DIAGNOSIS — Z12.11 SCREEN FOR COLON CANCER: ICD-10-CM

## 2024-02-15 DIAGNOSIS — Z78.0 ASYMPTOMATIC POSTMENOPAUSAL STATUS: ICD-10-CM

## 2024-02-15 DIAGNOSIS — G47.00 INSOMNIA, UNSPECIFIED TYPE: ICD-10-CM

## 2024-02-15 DIAGNOSIS — Z00.00 MEDICARE ANNUAL WELLNESS VISIT, SUBSEQUENT: Primary | ICD-10-CM

## 2024-02-15 DIAGNOSIS — G43.909 MIGRAINE WITHOUT STATUS MIGRAINOSUS, NOT INTRACTABLE, UNSPECIFIED MIGRAINE TYPE: ICD-10-CM

## 2024-02-15 DIAGNOSIS — M85.80 OSTEOPENIA, UNSPECIFIED LOCATION: ICD-10-CM

## 2024-02-15 DIAGNOSIS — Z29.11 NEED FOR VACCINATION AGAINST RESPIRATORY SYNCYTIAL VIRUS: ICD-10-CM

## 2024-02-15 LAB
ERYTHROCYTE [DISTWIDTH] IN BLOOD BY AUTOMATED COUNT: 12.8 % (ref 10–15)
HCT VFR BLD AUTO: 44 % (ref 35–47)
HGB BLD-MCNC: 14.7 G/DL (ref 11.7–15.7)
INR PPP: 0.97 (ref 0.85–1.15)
MCH RBC QN AUTO: 32.4 PG (ref 26.5–33)
MCHC RBC AUTO-ENTMCNC: 33.4 G/DL (ref 31.5–36.5)
MCV RBC AUTO: 97 FL (ref 78–100)
PLATELET # BLD AUTO: 208 10E3/UL (ref 150–450)
RBC # BLD AUTO: 4.54 10E6/UL (ref 3.8–5.2)
WBC # BLD AUTO: 6.7 10E3/UL (ref 4–11)

## 2024-02-15 PROCEDURE — 82306 VITAMIN D 25 HYDROXY: CPT | Performed by: FAMILY MEDICINE

## 2024-02-15 PROCEDURE — 80053 COMPREHEN METABOLIC PANEL: CPT | Performed by: FAMILY MEDICINE

## 2024-02-15 PROCEDURE — 85610 PROTHROMBIN TIME: CPT | Performed by: FAMILY MEDICINE

## 2024-02-15 PROCEDURE — 90677 PCV20 VACCINE IM: CPT | Performed by: FAMILY MEDICINE

## 2024-02-15 PROCEDURE — 80061 LIPID PANEL: CPT | Performed by: FAMILY MEDICINE

## 2024-02-15 PROCEDURE — 36415 COLL VENOUS BLD VENIPUNCTURE: CPT | Performed by: FAMILY MEDICINE

## 2024-02-15 PROCEDURE — G0439 PPPS, SUBSEQ VISIT: HCPCS | Performed by: FAMILY MEDICINE

## 2024-02-15 PROCEDURE — 85027 COMPLETE CBC AUTOMATED: CPT | Performed by: FAMILY MEDICINE

## 2024-02-15 PROCEDURE — G0009 ADMIN PNEUMOCOCCAL VACCINE: HCPCS | Performed by: FAMILY MEDICINE

## 2024-02-15 PROCEDURE — 99214 OFFICE O/P EST MOD 30 MIN: CPT | Mod: 25 | Performed by: FAMILY MEDICINE

## 2024-02-15 RX ORDER — RESPIRATORY SYNCYTIAL VIRUS VACCINE 120MCG/0.5
0.5 KIT INTRAMUSCULAR ONCE
Qty: 1 EACH | Refills: 0 | Status: CANCELLED | OUTPATIENT
Start: 2024-02-15 | End: 2024-02-15

## 2024-02-15 SDOH — HEALTH STABILITY: PHYSICAL HEALTH: ON AVERAGE, HOW MANY DAYS PER WEEK DO YOU ENGAGE IN MODERATE TO STRENUOUS EXERCISE (LIKE A BRISK WALK)?: 3 DAYS

## 2024-02-15 SDOH — HEALTH STABILITY: PHYSICAL HEALTH: ON AVERAGE, HOW MANY MINUTES DO YOU ENGAGE IN EXERCISE AT THIS LEVEL?: 30 MIN

## 2024-02-15 ASSESSMENT — SOCIAL DETERMINANTS OF HEALTH (SDOH): HOW OFTEN DO YOU GET TOGETHER WITH FRIENDS OR RELATIVES?: PATIENT DECLINED

## 2024-02-15 ASSESSMENT — PAIN SCALES - GENERAL: PAINLEVEL: NO PAIN (0)

## 2024-02-15 NOTE — PATIENT INSTRUCTIONS
"Sitter trial of the supplement \"Migrainex\" or combination of its components to see if this helps to reduce the frequency of your migraines.  You could consider meeting with a neurologist if you feel like your migraines are frequent or severe, would recommend this prior to initiation of a preventive medication such as Topamax.  Preventive Care Advice   This is general advice given by our system to help you stay healthy. However, your care team may have specific advice just for you. Please talk to your care team about your preventive care needs.  Nutrition  Eat 5 or more servings of fruits and vegetables each day.  Try wheat bread, brown rice and whole grain pasta (instead of white bread, rice, and pasta).  Get enough calcium and vitamin D. Check the label on foods and aim for 100% of the RDA (recommended daily allowance).  Lifestyle  Exercise at least 150 minutes each week  (30 minutes a day, 5 days a week).  Do muscle strengthening activities 2 days a week. These help control your weight and prevent disease.  No smoking.  Wear sunscreen to prevent skin cancer.  Have a dental exam and cleaning every 6 months.  Yearly exams  See your health care team every year to talk about:  Any changes in your health.  Any medicines your care team has prescribed.  Preventive care, family planning, and ways to prevent chronic diseases.  Shots (vaccines)   HPV shots (up to age 26), if you've never had them before.  Hepatitis B shots (up to age 59), if you've never had them before.  COVID-19 shot: Get this shot when it's due.  Flu shot: Get a flu shot every year.  Tetanus shot: Get a tetanus shot every 10 years.  Pneumococcal, hepatitis A, and RSV shots: Ask your care team if you need these based on your risk.  Shingles shot (for age 50 and up)  General health tests  Diabetes screening:  Starting at age 35, Get screened for diabetes at least every 3 years.  If you are younger than age 35, ask your care team if you should be screened " for diabetes.  Cholesterol test: At age 39, start having a cholesterol test every 5 years, or more often if advised.  Bone density scan (DEXA): At age 50, ask your care team if you should have this scan for osteoporosis (brittle bones).  Hepatitis C: Get tested at least once in your life.  STIs (sexually transmitted infections)  Before age 24: Ask your care team if you should be screened for STIs.  After age 24: Get screened for STIs if you're at risk. You are at risk for STIs (including HIV) if:  You are sexually active with more than one person.  You don't use condoms every time.  You or a partner was diagnosed with a sexually transmitted infection.  If you are at risk for HIV, ask about PrEP medicine to prevent HIV.  Get tested for HIV at least once in your life, whether you are at risk for HIV or not.  Cancer screening tests  Cervical cancer screening: If you have a cervix, begin getting regular cervical cancer screening tests starting at age 21.  Breast cancer scan (mammogram): If you've ever had breasts, begin having regular mammograms starting at age 40. This is a scan to check for breast cancer.  Colon cancer screening: It is important to start screening for colon cancer at age 45.  Have a colonoscopy test every 10 years (or more often if you're at risk) Or, ask your provider about stool tests like a FIT test every year or Cologuard test every 3 years.  To learn more about your testing options, visit:   [email or web address].  For help making a decision, visit:   https://bit.ly/jt11442.  Prostate cancer screening test: If you have a prostate, ask your care team if a prostate cancer screening test (PSA) at age 55 is right for you.  Lung cancer screening: If you are a current or former smoker ages 50 to 80, ask your care team if ongoing lung cancer screenings are right for you.  For informational purposes only. Not to replace the advice of your health care provider. Copyright   2023 [facility]. All rights reserved. Clinically reviewed by the [facility]. Remind 251619 - REV 01/24.    Learning About Stress  What is stress?     Stress is your body's response to a hard situation. Your body can have a physical, emotional, or mental response. Stress is a fact of life for most people, and it affects everyone differently. What causes stress for you may not be stressful for someone else.  A lot of things can cause stress. You may feel stress when you go on a job interview, take a test, or run a race. This kind of short-term stress is normal and even useful. It can help you if you need to work hard or react quickly. For example, stress can help you finish an important job on time.  Long-term stress is caused by ongoing stressful situations or events. Examples of long-term stress include long-term health problems, ongoing problems at work, or conflicts in your family. Long-term stress can harm your health.  How does stress affect your health?  When you are stressed, your body responds as though you are in danger. It makes hormones that speed up your heart, make you breathe faster, and give you a burst of energy. This is called the fight-or-flight stress response. If the stress is over quickly, your body goes back to normal and no harm is done.  But if stress happens too often or lasts too long, it can have bad effects. Long-term stress can make you more likely to get sick, and it can make symptoms of some diseases worse. If you tense up when you are stressed, you may develop neck, shoulder, or low back pain. Stress is linked to high blood pressure and heart disease.  Stress also harms your emotional health. It can make you lima, tense, or depressed. Your relationships may suffer, and you may not do well at work or school.  What can you do to manage stress?  You can try these things to help manage stress:   Do something active. Exercise or activity can help reduce  stress. Walking is a great way to get started. Even everyday activities such as housecleaning or yard work can help.  Try yoga or jaden chi. These techniques combine exercise and meditation. You may need some training at first to learn them.  Do something you enjoy. For example, listen to music or go to a movie. Practice your hobby or do volunteer work.  Meditate. This can help you relax, because you are not worrying about what happened before or what may happen in the future.  Do guided imagery. Imagine yourself in any setting that helps you feel calm. You can use online videos, books, or a teacher to guide you.  Do breathing exercises. For example:  From a standing position, bend forward from the waist with your knees slightly bent. Let your arms dangle close to the floor.  Breathe in slowly and deeply as you return to a standing position. Roll up slowly and lift your head last.  Hold your breath for just a few seconds in the standing position.  Breathe out slowly and bend forward from the waist.  Let your feelings out. Talk, laugh, cry, and express anger when you need to. Talking with supportive friends or family, a counselor, or a papa leader about your feelings is a healthy way to relieve stress. Avoid discussing your feelings with people who make you feel worse.  Write. It may help to write about things that are bothering you. This helps you find out how much stress you feel and what is causing it. When you know this, you can find better ways to cope.  What can you do to prevent stress?  You might try some of these things to help prevent stress:  Manage your time. This helps you find time to do the things you want and need to do.  Get enough sleep. Your body recovers from the stresses of the day while you are sleeping.  Get support. Your family, friends, and community can make a difference in how you experience stress.  Limit your news feed. Avoid or limit time on social media or news that may make you feel  "stressed.  Do something active. Exercise or activity can help reduce stress. Walking is a great way to get started.  Where can you learn more?  Go to https://www.[employer].net/patiented  Enter N032 in the search box to learn more about \"Learning About Stress.\"  Current as of: February 26, 2023               Content Version: 13.8    5344-7457 [employer].   Care instructions adapted under license by your healthcare professional. If you have questions about a medical condition or this instruction, always ask your healthcare professional. [employer] disclaims any warranty or liability for your use of this information.      Learning About Sleeping Well  What does sleeping well mean?     Sleeping well means getting enough sleep to feel good and stay healthy. How much sleep is enough varies among people.  The number of hours you sleep and how you feel when you wake up are both important. If you do not feel refreshed, you probably need more sleep. Another sign of not getting enough sleep is feeling tired during the day.  Experts recommend that adults get at least 7 or more hours of sleep per day. Children and older adults need more sleep.  Why is getting enough sleep important?  Getting enough quality sleep is a basic part of good health. When your sleep suffers, your physical health, mood, and your thoughts can suffer too. You may find yourself feeling more grumpy or stressed. Not getting enough sleep also can lead to serious problems, including injury, accidents, anxiety, and depression.  What might cause poor sleeping?  Many things can cause sleep problems, including:  Changes to your sleep schedule.  Stress. Stress can be caused by fear about a single event, such as giving a speech. Or you may have ongoing stress, such as worry about work or school.  Depression, anxiety, and other mental or emotional conditions.  Changes in your sleep habits or surroundings. This includes changes that happen " "where you sleep, such as noise, light, or sleeping in a different bed. It also includes changes in your sleep pattern, such as having jet lag or working a late shift.  Health problems, such as pain, breathing problems, and restless legs syndrome.  Lack of regular exercise.  Using alcohol, nicotine, or caffeine before bed.  How can you help yourself?  Here are some tips that may help you sleep more soundly and wake up feeling more refreshed.  Your sleeping area   Use your bedroom only for sleeping and sex. A bit of light reading may help you fall asleep. But if it doesn't, do your reading elsewhere in the house. Try not to use your TV, computer, smartphone, or tablet while you are in bed.  Be sure your bed is big enough to stretch out comfortably, especially if you have a sleep partner.  Keep your bedroom quiet, dark, and cool. Use curtains, blinds, or a sleep mask to block out light. To block out noise, use earplugs, soothing music, or a \"white noise\" machine.  Your evening and bedtime routine   Create a relaxing bedtime routine. You might want to take a warm shower or bath, or listen to soothing music.  Go to bed at the same time every night. And get up at the same time every morning, even if you feel tired.  What to avoid   Limit caffeine (coffee, tea, caffeinated sodas) during the day, and don't have any for at least 6 hours before bedtime.  Avoid drinking alcohol before bedtime. Alcohol can cause you to wake up more often during the night.  Try not to smoke or use tobacco, especially in the evening. Nicotine can keep you awake.  Limit naps during the day, especially close to bedtime.  Avoid lying in bed awake for too long. If you can't fall asleep or if you wake up in the middle of the night and can't get back to sleep within about 20 minutes, get out of bed and go to another room until you feel sleepy.  Avoid taking medicine right before bed that may keep you awake or make you feel hyper or energized. Your " "doctor can tell you if your medicine may do this and if you can take it earlier in the day.  If you can't sleep   Imagine yourself in a peaceful, pleasant scene. Focus on the details and feelings of being in a place that is relaxing.  Get up and do a quiet or boring activity until you feel sleepy.  Avoid drinking any liquids before going to bed to help prevent waking up often to use the bathroom.  Where can you learn more?  Go to https://www.[employer].net/patiented  Enter J942 in the search box to learn more about \"Learning About Sleeping Well.\"  Current as of: July 11, 2023               Content Version: 13.8    3032-0176 [employer].   Care instructions adapted under license by your healthcare professional. If you have questions about a medical condition or this instruction, always ask your healthcare professional. [employer] disclaims any warranty or liability for your use of this information.      Bladder Training: Care Instructions  Your Care Instructions     Bladder training is used to treat urge incontinence and stress incontinence. Urge incontinence means that the need to urinate comes on so fast that you can't get to a toilet in time. Stress incontinence means that you leak urine because of pressure on your bladder. For example, it may happen when you laugh, cough, or lift something heavy.  Bladder training can increase how long you can wait before you have to urinate. It can also help your bladder hold more urine. And it can give you better control over the urge to urinate.  It is important to remember that bladder training takes a few weeks to a few months to make a difference. You may not see results right away, but don't give up.  Follow-up care is a key part of your treatment and safety. Be sure to make and go to all appointments, and call your doctor if you are having problems. It's also a good idea to know your test results and keep a list of the medicines you take.  How " can you care for yourself at home?  Work with your doctor to come up with a bladder training program that is right for you. You may use one or more of the following methods.  Delayed urination  In the beginning, try to keep from urinating for 5 minutes after you first feel the need to go.  While you wait, take deep, slow breaths to relax. Kegel exercises can also help you delay the need to go to the bathroom.  After some practice, when you can easily wait 5 minutes to urinate, try to wait 10 minutes before you urinate.  Slowly increase the waiting period until you are able to control when you have to urinate.  Scheduled urination  Empty your bladder when you first wake up in the morning.  Schedule times throughout the day when you will urinate.  Start by going to the bathroom every hour, even if you don't need to go.  Slowly increase the time between trips to the bathroom.  When you have found a schedule that works well for you, keep doing it.  If you wake up during the night and have to urinate, do it. Apply your schedule to waking hours only.  Kegel exercises  These tighten and strengthen pelvic muscles, which can help you control the flow of urine. (If doing these exercises causes pain, stop doing them and talk with your doctor.) To do Kegel exercises:  Squeeze your muscles as if you were trying not to pass gas. Or squeeze your muscles as if you were stopping the flow of urine. Your belly, legs, and buttocks shouldn't move.  Hold the squeeze for 3 seconds, then relax for 5 to 10 seconds.  Start with 3 seconds, then add 1 second each week until you are able to squeeze for 10 seconds.  Repeat the exercise 10 times a session. Do 3 to 8 sessions a day.  When should you call for help?  Watch closely for changes in your health, and be sure to contact your doctor if:    Your incontinence is getting worse.     You do not get better as expected.   Where can you learn more?  Go to  "https://www.[employer].net/patiented  Enter V684 in the search box to learn more about \"Bladder Training: Care Instructions.\"  Current as of: February 28, 2023               Content Version: 13.8 2006-2023 [employer].   Care instructions adapted under license by your healthcare professional. If you have questions about a medical condition or this instruction, always ask your healthcare professional. [employer] disclaims any warranty or liability for your use of this information.      "

## 2024-02-15 NOTE — PROGRESS NOTES
Preventive Care Visit  [facility]  Zeinab Oneal MD, Family Medicine  Feb 15, 2024    Assessment & Plan     Medicare annual wellness visit, subsequent  At today's visit, we discussed lifestyle interventions to promote self-management and wellness, including maintenance of a healthy weight, healthy diet, regular physical activity and exercise, and falls prevention.  PCV 20 administered today.  Encourage consideration of RSV vaccine.  Will screen for diabetes and dyslipidemia today.  She is up-to-date with mammogram screening, colon cancer screening, bone density screening.  - Lipid panel reflex to direct LDL Fasting; Future  - Lipid panel reflex to direct LDL Fasting    Migraine without status migrainosus, not intractable, unspecified migraine type  Overall stable on zolmitriptan though they are occurring every 3 weeks or so and occasionally rizatriptan does not work.  We discussed options.  Encouraged a trial of Migrainex supplement.  Could give consideration to neurologic consultation.  Discussed that preventive medication such as Topamax would be an option, newer medications including Nurtec, Ajovy, Emgality, etc. would be an option as well.  She will consider.    Insomnia, unspecified type  Chronic, stable and managed.    Osteopenia, unspecified location  Encouraged weightbearing activities, measures to reduce risk of falls, adequate calcium and vitamin D intake.  Will check vitamin D level.  Her bone density scan will be due this spring, order placed.  - DX Hip/Pelvis/Spine; Future  - Vitamin D Deficiency; Future  - Vitamin D Deficiency    Need for vaccination against respiratory syncytial virus  Discussed and encouraged.    Screen for colon cancer  Referral placed for her routine colonoscopy.  - Colonoscopy Screening  Referral; Future    Asymptomatic postmenopausal status  Order placed for follow-up bone density scan as above.  - DX Hip/Pelvis/Spine;  "Future    Bleeding  Unremarkable examination today.  Will look for thrombocytopenia, coagulopathy, kidney liver changes that could contribute to bleeding.  - Comprehensive metabolic panel; Future  - CBC with platelets; Future  - INR; Future  - Comprehensive metabolic panel  - CBC with platelets  - INR            BMI  Estimated body mass index is 28.44 kg/m  as calculated from the following:    Height as of this encounter: 1.689 m (5' 6.5\").    Weight as of this encounter: 81.1 kg (178 lb 14.4 oz).       Counseling  Appropriate preventive services were discussed with this patient, including applicable screening as appropriate for fall prevention, nutrition, physical activity, Tobacco-use cessation, weight loss and cognition.  Checklist reviewing preventive services available has been given to the patient.  Reviewed patient's diet, addressing concerns and/or questions.   She is at risk for lack of exercise and has been provided with information to increase physical activity for the benefit of her well-being.   She is at risk for psychosocial distress and has been provided with information to reduce risk.   Discussed possible causes of fatigue. Information on urinary incontinence and treatment options given to patient.             Lizzy Calderón is a 68 year old, presenting for the following:  Wellness Visit (fasting), Migraines (Brother is on topiramate, told her to ask about starting), Teeth cleaning bleeding dental cleaning, RSV  (Would you recommend?), and Is she taking to much vitamin D (Taking total of 2000iu a day)          Health Care Directive  Patient does not have a Health Care Directive or Living Will: Discussed advance care planning with patient; information given to patient to review.    Seen in clinic today for annual wellness visit.  Followed by dermatology, recent Mohs procedure for basal cell carcinoma on her forehead.  Working with physical therapy due to left lateral thigh discomfort, that is " going well.  Noted that she bled more than is typical with recent dental cleaning, also with Mohs procedure, interested in further evaluation of this.  She is not otherwise noting bruising or bleeding elsewhere.  History of migraines, uses only triptan, they occur about every 3 weeks.  Zolmitriptan usually is helpful.  Brother takes Topamax, she wonders if she should as well.  Chronic struggles with insomnia are stable.          2/15/2024   General Health   How would you rate your overall physical health? Good   Feel stress (tense, anxious, or unable to sleep) Only a little   (!) STRESS CONCERN      2/15/2024   Nutrition   Diet: Regular (no restrictions)         2/15/2024   Exercise   Days per week of moderate/strenous exercise 3 days   Average minutes spent exercising at this level 30 min         2/15/2024   Social Factors   Frequency of gathering with friends or relatives Patient declined   Worry food won't last until get money to buy more No   Food not last or not have enough money for food? No   Do you have housing?  Yes   Are you worried about losing your housing? No   Lack of transportation? No   Unable to get utilities (heat,electricity)? No         2/15/2024   Fall Risk   Fallen 2 or more times in the past year? No   Trouble with walking or balance? No          2/15/2024   Activities of Daily Living- Home Safety   Needs help with the following daily activites None of the above   Safety concerns in the home None of the above         2/15/2024   Dental   Dentist two times every year? Yes         2/15/2024   Hearing Screening   Hearing concerns? None of the above         2/15/2024   Driving Risk Screening   Patient/family members have concerns about driving No         2/15/2024   General Alertness/Fatigue Screening   Have you been more tired than usual lately? (!) YES         2/15/2024   Urinary Incontinence Screening   Bothered by leaking urine in past 6 months Yes          No data to display                   Today's PHQ-2 Score:       2/15/2024     1:02 PM   PHQ-2 (  [employer])   Q1: Little interest or pleasure in doing things 0   Q2: Feeling down, depressed or hopeless 0   PHQ-2 Score 0   Q1: Little interest or pleasure in doing things Not at all   Q2: Feeling down, depressed or hopeless Not at all   PHQ-2 Score 0           2/15/2024   Substance Use   Alcohol more than 3/day or more than 7/wk No   Do you have a current opioid prescription? No   How severe/bad is pain from 1 to 10? 2/10   Do you use any other substances recreationally? No     Social History     Tobacco Use    Smoking status: Never    Smokeless tobacco: Never   Vaping Use    Vaping Use: Never used   Substance Use Topics    Alcohol use: No     Comment: Alcoholic Drinks/day: once a month    Drug use: No            No data to display                 Mammogram Screening - Mammogram every 1-2 years updated in Health Maintenance based on mutual decision making    The 10-year ASCVD risk score (Deacon YOUNG, et al., 2019) is: 5.9%    Values used to calculate the score:      Age: 68 years      Sex: Female      Is Non- : No      Diabetic: No      Tobacco smoker: No      Systolic Blood Pressure: 114 mmHg      Is BP treated: No      HDL Cholesterol: 59 mg/dL      Total Cholesterol: 191 mg/dL            Reviewed and updated as needed this visit by Provider                    Past Medical History:   Diagnosis Date    Migraine      Past Surgical History:   Procedure Laterality Date    CATARACT EXTRACTION Left     FOOT SURGERY Left     RETINAL DETACHMENT SURGERY Left 2015     OB History    Para Term  AB Living   1 1 1 0 0 0   SAB IAB Ectopic Multiple Live Births   0 0 0 0 0      # Outcome Date GA Lbr Javier/2nd Weight Sex Delivery Anes PTL Lv   1 Term              Lab work is in process  Labs reviewed in EPIC  BP Readings from Last 3 Encounters:   02/15/24 114/80   23 (!) 148/88   21 114/80    Wt Readings from  Last 3 Encounters:   02/15/24 81.1 kg (178 lb 14.4 oz)   04/06/22 79.6 kg (175 lb 8 oz)   01/04/21 85.7 kg (189 lb)                  Patient Active Problem List   Diagnosis    Allergic Rhinitis    Migraine Headache    Insomnia     Past Surgical History:   Procedure Laterality Date    CATARACT EXTRACTION Left     FOOT SURGERY Left 1994    RETINAL DETACHMENT SURGERY Left 11/2015       Social History     Tobacco Use    Smoking status: Never    Smokeless tobacco: Never   Substance Use Topics    Alcohol use: No     Comment: Alcoholic Drinks/day: once a month     Family History   Problem Relation Age of Onset    Kidney Cancer Mother     Basal cell carcinoma Mother     No Known Problems Sister     Basal cell carcinoma Brother     No Known Problems Brother     No Known Problems Brother     Breast Cancer No family hx of     Colon Cancer No family hx of     Prostate Cancer No family hx of     Ovarian Cancer No family hx of     Clotting Disorder No family hx of          Current Outpatient Medications   Medication Sig Dispense Refill    cholecalciferol, vitamin D3, (VITAMIN D3) 1,000 unit capsule [CHOLECALCIFEROL, VITAMIN D3, (VITAMIN D3) 1,000 UNIT CAPSULE] Take 1,000 Units by mouth daily.      COVID-19 mRNA vacc, Moderna, 100 MCG/0.5ML injection       LACTOBACILLUS ACIDOPHILUS (PROBIOTIC ORAL) [LACTOBACILLUS ACIDOPHILUS (PROBIOTIC ORAL)] Take 1 capsule by mouth daily.       magnesium 30 mg tablet [MAGNESIUM 30 MG TABLET] Take 30 mg by mouth 2 (two) times a day.      multivitamin therapeutic (THERAGRAN) tablet [MULTIVITAMIN THERAPEUTIC (THERAGRAN) TABLET] Take 1 tablet by mouth daily.      ZOLMitriptan (ZOMIG) 5 MG tablet [ZOLMITRIPTAN (ZOMIG) 5 MG TABLET] TAKE ONE-HALF (1/2) TABLET AS NEEDED FOR MIGRAINE 36 tablet 4     No Known Allergies  Recent Labs   Lab Test 02/15/24  1405 06/27/22  0841 07/24/20  1137   * 99  --    HDL 71 59  --    TRIG 120 163*  --    ALT 34  --  25   CR 0.80  --  0.77   GFRESTIMATED 80  --  >60  "  GFRESTBLACK  --   --  >60   POTASSIUM 4.4  --  4.9      Current providers sharing in care for this patient include:  Patient Care Team:  Zeinab Oneal MD as PCP - General (Family Practice)  Onofre Noble MD as Assigned PCP    The following health maintenance items are reviewed in Epic and correct as of today:  Health Maintenance   Topic Date Due    RSV VACCINE (Pregnancy & 60+) (1 - 1-dose 60+ series) Never done    MEDICARE ANNUAL WELLNESS VISIT  04/06/2023    Pneumococcal Vaccine: 65+ Years (2 of 2 - PCV) 04/06/2023    COLORECTAL CANCER SCREENING  01/27/2024    ANNUAL REVIEW OF HM ORDERS  04/03/2024    DEXA  04/26/2024    FALL RISK ASSESSMENT  02/15/2025    GLUCOSE  06/27/2025    MAMMO SCREENING  12/26/2025    DTAP/TDAP/TD IMMUNIZATION (2 - Td or Tdap) 10/06/2026    ADVANCE CARE PLANNING  04/06/2027    LIPID  06/27/2027    HEPATITIS C SCREENING  Completed    PHQ-2 (once per calendar year)  Completed    INFLUENZA VACCINE  Completed    ZOSTER IMMUNIZATION  Completed    COVID-19 Vaccine  Completed    IPV IMMUNIZATION  Aged Out    HPV IMMUNIZATION  Aged Out    MENINGITIS IMMUNIZATION  Aged Out    RSV MONOCLONAL ANTIBODY  Aged Out         Review of Systems  Constitutional, neuro, ENT, endocrine, pulmonary, cardiac, gastrointestinal, genitourinary, musculoskeletal, integument and psychiatric systems are negative, except as otherwise noted.     Objective    Exam  /80   Pulse 70   Temp 97.8  F (36.6  C) (Temporal)   Resp 18   Ht 1.689 m (5' 6.5\")   Wt 81.1 kg (178 lb 14.4 oz)   SpO2 98%   BMI 28.44 kg/m     Estimated body mass index is 28.44 kg/m  as calculated from the following:    Height as of this encounter: 1.689 m (5' 6.5\").    Weight as of this encounter: 81.1 kg (178 lb 14.4 oz).    Physical Exam  Physical Examination: General appearance - alert, well appearing, and in no distress, oriented to person, place, and time and normal appearing weight  Mental status - alert, oriented to person, " place, and time, normal mood, behavior, speech, dress, motor activity, and thought processes  Eyes - pupils equal and reactive, extraocular eye movements intact  Ears - bilateral TM's and external ear canals normal  Nose - normal and patent, no erythema, discharge or polyps  Mouth - mucous membranes moist, pharynx normal without lesions  Neck - supple, no significant adenopathy  Lymphatics - no palpable lymphadenopathy, no hepatosplenomegaly  Chest - clear to auscultation, no wheezes, rales or rhonchi, symmetric air entry  Heart - normal rate, regular rhythm, normal S1, S2, no murmurs, rubs, clicks or gallops  Abdomen - soft, nontender, nondistended, no masses or organomegaly  Neurological - alert, oriented, normal speech, no focal findings or movement disorder noted  Musculoskeletal - no joint tenderness, deformity or swelling  Extremities - peripheral pulses normal, no pedal edema, no clubbing or cyanosis  Skin - normal coloration and turgor, no rashes, no suspicious skin lesions noted; bandage on forehead at site of previous dermatology resection        2/15/2024   Mini Cog   Clock Draw Score 2 Normal   3 Item Recall 3 objects recalled   Mini Cog Total Score 5            Signed Electronically by: Zeinab Oneal MD

## 2024-02-16 LAB
ALBUMIN SERPL BCG-MCNC: 4.1 G/DL (ref 3.5–5.2)
ALP SERPL-CCNC: 73 U/L (ref 40–150)
ALT SERPL W P-5'-P-CCNC: 34 U/L (ref 0–50)
ANION GAP SERPL CALCULATED.3IONS-SCNC: 9 MMOL/L (ref 7–15)
AST SERPL W P-5'-P-CCNC: 27 U/L (ref 0–45)
BILIRUB SERPL-MCNC: 0.7 MG/DL
BUN SERPL-MCNC: 16.4 MG/DL (ref 8–23)
CALCIUM SERPL-MCNC: 9.8 MG/DL (ref 8.8–10.2)
CHLORIDE SERPL-SCNC: 107 MMOL/L (ref 98–107)
CHOLEST SERPL-MCNC: 219 MG/DL
CREAT SERPL-MCNC: 0.8 MG/DL (ref 0.51–0.95)
DEPRECATED HCO3 PLAS-SCNC: 27 MMOL/L (ref 22–29)
EGFRCR SERPLBLD CKD-EPI 2021: 80 ML/MIN/1.73M2
FASTING STATUS PATIENT QL REPORTED: YES
GLUCOSE SERPL-MCNC: 88 MG/DL (ref 70–99)
HDLC SERPL-MCNC: 71 MG/DL
LDLC SERPL CALC-MCNC: 124 MG/DL
NONHDLC SERPL-MCNC: 148 MG/DL
POTASSIUM SERPL-SCNC: 4.4 MMOL/L (ref 3.4–5.3)
PROT SERPL-MCNC: 6.8 G/DL (ref 6.4–8.3)
SODIUM SERPL-SCNC: 143 MMOL/L (ref 135–145)
TRIGL SERPL-MCNC: 120 MG/DL
VIT D+METAB SERPL-MCNC: 52 NG/ML (ref 20–50)

## 2024-06-03 ENCOUNTER — TRANSFERRED RECORDS (OUTPATIENT)
Dept: HEALTH INFORMATION MANAGEMENT | Facility: CLINIC | Age: 69
End: 2024-06-03
Payer: COMMERCIAL

## 2024-07-24 ENCOUNTER — E-VISIT (OUTPATIENT)
Dept: URGENT CARE | Facility: CLINIC | Age: 69
End: 2024-07-24
Payer: COMMERCIAL

## 2024-07-24 DIAGNOSIS — N39.0 ACUTE UTI (URINARY TRACT INFECTION): Primary | ICD-10-CM

## 2024-07-24 PROCEDURE — 99421 OL DIG E/M SVC 5-10 MIN: CPT | Performed by: PHYSICIAN ASSISTANT

## 2024-07-24 RX ORDER — SULFAMETHOXAZOLE/TRIMETHOPRIM 800-160 MG
1 TABLET ORAL 2 TIMES DAILY
Qty: 6 TABLET | Refills: 0 | Status: SHIPPED | OUTPATIENT
Start: 2024-07-24 | End: 2024-07-27

## 2024-07-24 NOTE — PATIENT INSTRUCTIONS
Dear Kaitlynn Ontiveros    After reviewing your responses, I've been able to diagnose you with a urinary tract infection, which is a common infection of the bladder with bacteria.  This is not a sexually transmitted infection, though urinating immediately after intercourse can help prevent infections.  Drinking lots of fluids is also helpful to clear your current infection and prevent the next one.      I have sent a prescription for antibiotics to your pharmacy to treat this infection.    It is important that you take all of your prescribed medication even if your symptoms are improving after a few doses.  Taking all of your medicine helps prevent the symptoms from returning.     If your symptoms worsen, you develop pain in your back or stomach, develop fevers, or are not improving in 5 days, please contact your primary care provider for an appointment or visit any of our convenient Walk-in or Urgent Care Centers to be seen, which can be found on our website here.    Thanks again for choosing us as your health care partner,    Vikram Billy PA-C

## 2024-09-17 ENCOUNTER — NURSE TRIAGE (OUTPATIENT)
Dept: FAMILY MEDICINE | Facility: CLINIC | Age: 69
End: 2024-09-17

## 2024-09-17 ENCOUNTER — VIRTUAL VISIT (OUTPATIENT)
Dept: URGENT CARE | Facility: CLINIC | Age: 69
End: 2024-09-17
Payer: COMMERCIAL

## 2024-09-17 DIAGNOSIS — U07.1 INFECTION DUE TO 2019 NOVEL CORONAVIRUS: Primary | ICD-10-CM

## 2024-09-17 PROCEDURE — 99441 PR PHYSICIAN TELEPHONE EVALUATION 5-10 MIN: CPT | Mod: 93

## 2024-09-17 RX ORDER — NIACIN 500 MG
TABLET ORAL
COMMUNITY

## 2024-09-17 NOTE — TELEPHONE ENCOUNTER
I am happy to prescribe the Paxlovid as zolmitriptan is taken just as needed.  If she has not taken zolmitriptan in the last 3 days and as long as she is agreeable to not taking his own triptan while taking Paxlovid, I think would be fine to prescribe.  Please check with patient and let me know.  VJ

## 2024-09-17 NOTE — PATIENT INSTRUCTIONS
Hold your Zomig while taking Paxlovid and continue holding it for 3 more days after your Paxlovid treatment is complete.

## 2024-09-17 NOTE — TELEPHONE ENCOUNTER
RN COVID TREATMENT VISIT  09/17/24    The patient has been triaged and does not require a higher level of care.    Kaitlynn Ontiveros  69 year old  Current weight? 190 lbs    Has the patient been seen by a primary care provider at an [facility] or [facility] within the past two years? Yes.   Have you been in close proximity to/do you have a known exposure to a person with a confirmed case of influenza? No.     General treatment eligibility:  Date of positive COVID test (PCR or at home)?  9/17/24    Symptoms started on 9/15/24    Are you or have you been hospitalized for this COVID-19 infection? No.   Have you received monoclonal antibodies or antiviral treatment for COVID-19 since this positive test? No.   Do you have any of the following conditions that place you at risk of being very sick from COVID-19?   - Age 50 years or older  - Overweight or Obesity (BMI >85th percentile or BMI 25 or higher)  Yes, patient has at least one high risk condition as noted above.     Current COVID symptoms:   - fever or chills  - cough  - fatigue  - muscle or body aches  - headache  - sore throat  - congestion or runny nose  Yes. Patient has at least one symptom as selected.     How many days since symptoms started? 5 days or less. Established patient, 12 years or older weighing at least 88.2 lbs, who has symptoms that started in the past 5 days, has not been hospitalized nor received treatment already, and is at risk for being very sick from COVID-19.     Treatment eligibility by RN:  Are you currently pregnant or nursing? No  Do you have a clinically significant hypersensitivity to nirmatrelvir or ritonavir, or toxic epidermal necrolysis (TEN) or Chen-Андрей Syndrome? No  Do you have a history of hepatitis, any hepatic impairment on the Problem List (such as Child-Ford Class C, cirrhosis, fatty liver disease, alcoholic liver disease), or was the last liver lab (hepatic panel, ALT, AST, ALK Phos,  bilirubin) elevated in the past 6 months? No  Do you have any history of severe renal impairment (eGFR < 30mL/min)? No    Is patient eligible to continue? Yes, patient meets all eligibility requirements for the RN COVID treatment (as denoted by all no responses above).     Current Outpatient Medications   Medication Sig Dispense Refill    cholecalciferol, vitamin D3, (VITAMIN D3) 1,000 unit capsule [CHOLECALCIFEROL, VITAMIN D3, (VITAMIN D3) 1,000 UNIT CAPSULE] Take 1,000 Units by mouth daily.      COVID-19 mRNA vacc, Moderna, 100 MCG/0.5ML injection       LACTOBACILLUS ACIDOPHILUS (PROBIOTIC ORAL) [LACTOBACILLUS ACIDOPHILUS (PROBIOTIC ORAL)] Take 1 capsule by mouth daily.       magnesium 30 mg tablet [MAGNESIUM 30 MG TABLET] Take 30 mg by mouth 2 (two) times a day.      multivitamin therapeutic (THERAGRAN) tablet [MULTIVITAMIN THERAPEUTIC (THERAGRAN) TABLET] Take 1 tablet by mouth daily.      ZOLMitriptan (ZOMIG) 5 MG tablet [ZOLMITRIPTAN (ZOMIG) 5 MG TABLET] TAKE ONE-HALF (1/2) TABLET AS NEEDED FOR MIGRAINE 36 tablet 4       Medications from List 1 of the standing order (on medications that exclude the use of Paxlovid) that patient is taking: NONE. Is patient taking Marianne's Wort? No  Is patient taking Marianne's Wort or any meds from List 1? No.   Medications from List 2 of the standing order (on meds that provider needs to adjust) that patient is taking: zolmitriptan (Zolmig), explained a provider visit is necessary to discuss medication adjustments while taking Paxlovid. Is patient on any of the meds from List 2? Yes. Patient scheduled for virtual urgent care visit today at 8:30pm.     Also routing to PCP to review.     Rosanne Ortiz RN      Reason for Disposition   HIGH RISK patient (e.g., weak immune system, age > 64 years, obesity with BMI of 30 or higher, pregnant, chronic lung disease or other chronic medical condition) and COVID symptoms (e.g., cough, fever)  (Exceptions: Already seen by doctor or NP/PA  and no new or worsening symptoms.)    Additional Information   Negative: SEVERE difficulty breathing (e.g., struggling for each breath, speaks in single words)   Negative: Difficult to awaken or acting confused (e.g., disoriented, slurred speech)   Negative: Bluish (or gray) lips or face now   Negative: Shock suspected (e.g., cold/pale/clammy skin, too weak to stand, low BP, rapid pulse)   Negative: Sounds like a life-threatening emergency to the triager   Negative: Diagnosed or suspected COVID-19 and symptoms lasting 3 or more weeks   Negative: COVID-19 exposure and no symptoms   Negative: COVID-19 vaccine reaction suspected (e.g., fever, headache, muscle aches) occurring 1 to 3 days after getting vaccine   Negative: COVID-19 vaccine, questions about   Negative: Lives with someone known to have influenza (flu test positive) and flu-like symptoms (e.g., cough, runny nose, sore throat, SOB; with or without fever)   Negative: Possible COVID-19 symptoms and triager concerned about severity of symptoms or other causes   Negative: COVID-19 and breastfeeding, questions about   Negative: SEVERE or constant chest pain or pressure  (Exception: Mild central chest pain, present only when coughing.)   Negative: MODERATE difficulty breathing (e.g., speaks in phrases, SOB even at rest, pulse 100-120)   Negative: Headache and stiff neck (can't touch chin to chest)   Negative: Oxygen level (e.g., pulse oximetry) 90% or lower   Negative: Chest pain or pressure  (Exception: MILD central chest pain, present only when coughing.)   Negative: Drinking very little and dehydration suspected (e.g., no urine > 12 hours, very dry mouth, very lightheaded)   Negative: Patient sounds very sick or weak to the triager   Negative: MILD difficulty breathing (e.g., minimal/no SOB at rest, SOB with walking, pulse <100)   Negative: Fever > 103 F (39.4 C)   Negative: Fever > 101 F (38.3 C) and over 60 years of age   Negative: Fever > 100.0 F (37.8 C)  "and bedridden (e.g., CVA, chronic illness, recovering from surgery)    Answer Assessment - Initial Assessment Questions  1. COVID-19 DIAGNOSIS: \"How do you know that you have COVID?\" (e.g., positive lab test or self-test, diagnosed by doctor or NP/PA, symptoms after exposure).      Home test today.   2. COVID-19 EXPOSURE: \"Was there any known exposure to COVID before the symptoms began?\" CDC Definition of close contact: within 6 feet (2 meters) for a total of 15 minutes or more over a 24-hour period.       Just got back from [place]- but not specific  3. ONSET: \"When did the COVID-19 symptoms start?\"       Sunday- 9/15  4. WORST SYMPTOM: \"What is your worst symptom?\" (e.g., cough, fever, shortness of breath, muscle aches)      Cough  5. COUGH: \"Do you have a cough?\" If Yes, ask: \"How bad is the cough?\"        Yes, \"very aggravating\". Not constant.   6. FEVER: \"Do you have a fever?\" If Yes, ask: \"What is your temperature, how was it measured, and when did it start?\"      99.3 oral   7. RESPIRATORY STATUS: \"Describe your breathing?\" (e.g., normal; shortness of breath, wheezing, unable to speak)       No shortness of breath or wheezing  8. BETTER-SAME-WORSE: \"Are you getting better, staying the same or getting worse compared to yesterday?\"  If getting worse, ask, \"In what way?\"      Getting worse   9. OTHER SYMPTOMS: \"Do you have any other symptoms?\"  (e.g., chills, fatigue, headache, loss of smell or taste, muscle pain, sore throat)      Fatigue, headache, muscle pain, sore throat  No loss of taste or smell  10. HIGH RISK DISEASE: \"Do you have any chronic medical problems?\" (e.g., asthma, heart or lung disease, weak immune system, obesity, etc.)        No  11. VACCINE: \"Have you had the COVID-19 vaccine?\" If Yes, ask: \"Which one, how many shots, when did you get it?\"        Yes, but not the newest one, every other one.   12. PREGNANCY: \"Is there any chance you are pregnant?\" \"When was your last menstrual period?\"       " " N/a  13. O2 SATURATION MONITOR:  \"Do you use an oxygen saturation monitor (pulse oximeter) at home?\" If Yes, ask \"What is your reading (oxygen level) today?\" \"What is your usual oxygen saturation reading?\" (e.g., 95%)        No    Protocols used: Coronavirus (COVID-19) Diagnosed or [id number]    Rosanne Ortiz RN  [facility]  "

## 2024-09-17 NOTE — PROGRESS NOTES
"Kaitlynn is a 69 year old who is being evaluated via a billable telephone visit.      Originating Location (pt. Location): Home    Distant Location (provider location):  On-site    Assessment & Plan     Infection due to 2019 novel coronavirus    - nirmatrelvir and ritonavir (PAXLOVID) 300 mg/100 mg therapy pack; Take 3 tablets by mouth 2 times daily for 5 days. (Take 2 Nirmatrelvir tablets and 1 Ritonavir tablet twice daily for 5 days)    Has not used her Zolmig for several days.       BMI  Estimated body mass index is 28.44 kg/m  as calculated from the following:    Height as of 2/15/24: 1.689 m (5' 6.5\").    Weight as of 2/15/24: 81.1 kg (178 lb 14.4 oz).         COVID-19 positive patient.  Encounter for consideration of medication intervention. Patient does qualify for a prescription. Full discussion with patient including medication options, risks and benefits. Potential drug interactions reviewed with patient.     Treatment Planned  Paxlovid sent to [facility]    Temporary change to home medications: will hold her Zomig while on Paxlovid and for 3 more days after tx is complete.     Estimated body mass index is 28.44 kg/m  as calculated from the following:    Height as of 2/15/24: 1.689 m (5' 6.5\").    Weight as of 2/15/24: 81.1 kg (178 lb 14.4 oz).  GFR Estimate   Date Value Ref Range Status   02/15/2024 80 >60 mL/min/1.73m2 Final   07/24/2020 >60 >60 mL/min/1.73m2 Final     No results found for: \"[id number]\"    Return in about 1 week (around 9/24/2024), or if symptoms worsen or fail to improve.    Subjective   Kaitlynn is a 69 year old, presenting for the following health issues:  No chief complaint on file.    HPI       COVID-19 Symptom Review  How many days ago did these symptoms start? 2 days ago    Are any of the following symptoms significant for you?  New or worsening difficulty breathing? No  Worsening cough? Yes, it's a dry cough.   Fever or chills? Yes, I felt feverish or had chills.  Headache: " YES  Sore throat: YES  Chest pain: No  Diarrhea: No  Body aches? YES    What treatments has patient tried? Guaifenesin (mucinex) and Nonsteroidals   Does patient live in a nursing home, group home, or shelter? No  Does patient have a way to get food/medications during quarantined? Yes, I have a friend or family member who can help me.                Review of Systems  Constitutional, HEENT, cardiovascular, pulmonary, gi and gu systems are negative, except as otherwise noted.      Objective           Vitals:  No vitals were obtained today due to virtual visit.    Physical Exam   General: Alert and no distress //Respiratory: No audible wheeze, cough, or shortness of breath // Psychiatric:  Appropriate affect, tone, and pace of words            Phone call duration: 8 minutes  Signed Electronically by: [facility]

## 2024-09-17 NOTE — TELEPHONE ENCOUNTER
Writer called patient. Upon chart review, patient was already seen by virtual provider and prescribed medication. Patient denies any further questions or concerns at this time.     GENE HuddlestonN, RN  [facility]

## 2024-09-24 ENCOUNTER — MYC MEDICAL ADVICE (OUTPATIENT)
Dept: FAMILY MEDICINE | Facility: CLINIC | Age: 69
End: 2024-09-24
Payer: COMMERCIAL

## 2024-09-25 ENCOUNTER — NURSE TRIAGE (OUTPATIENT)
Dept: NURSING | Facility: CLINIC | Age: 69
End: 2024-09-25
Payer: COMMERCIAL

## 2024-09-25 NOTE — TELEPHONE ENCOUNTER
General Call      Reason for Call:  medication question: ZOLMitriptan (ZOMIG) 5 MG tablet     What are your questions or concerns:  patient is calling in and wanting to know when it would be safe to restart taking her medication: ZOLMitriptan (ZOMIG) 5 MG tablet, following the treatment of COVID.    Patient states she started taking the COVID treatment on 9/17/24 at 3pm.     Please advise    Could we send this information to you in [place] or would you prefer to receive a phone call?:   Patient would prefer a phone call   Okay to leave a detailed message?: Yes at Cell number on file:    Telephone Information:   Mobile [phone]

## 2024-09-26 NOTE — TELEPHONE ENCOUNTER
I do not know that there is a clear recommendation for how long to hold the zolmitriptan.  I usually recommend 5 days after completion of the antiviral.  VJ

## 2024-09-26 NOTE — TELEPHONE ENCOUNTER
Nurse Triage SBAR    Is this a 2nd Level Triage? YES, LICENSED PRACTITIONER REVIEW IS REQUIRED    Situation: Patient has severe migraine     Background: Patient was prescribed paxlovid for Covid and finished the dose on 09/22/2024 and was told not to take the Zomig during the same time due to interactions. However, she currently has a severe migraine and wants to know if it's ok to restart taking this medication.     Assessment: Patient did not want to triage the headache, she says she has had this for many years and just wants to know whether she can take the medication. She says it usually works for these migraines.     Protocol Recommended Disposition:   Call PCP Now    Recommendation: MD On-call was ok with her taking the Zomig since she had been done with Paxlovid for 3 days.      Paged to provider    Does the patient meet one of the following criteria for ADS visit consideration? 16+ years old, with an [facility] PCP     TIP  Providers, please consider if this condition is appropriate for management at one of our Acute and Diagnostic Services sites.     If patient is a good candidate, please use dotphrase <dot>triageresponse and select Refer to ADS to document.      Provider consult indicated.     Reason for page: Urgent need for medication question    Page sent to Dr. Onofre Noble by RN at 1948. Called answering service at 2010, spoke to provider at 2011    Provider Recommendation Follow Up:   Reached patient/caregiver. Informed of provider's recommendations. Patient verbalized understanding and agrees with the plan.         Sharon Bailey RN     Reason for Disposition   [1] Caller has URGENT medicine question about med that PCP or specialist prescribed AND [2] triager unable to answer question    Protocols used: Medication Question Call-A-

## 2024-09-26 NOTE — TELEPHONE ENCOUNTER
FYI - Status Update    Who is Calling: patient    Update: patient calling and states she called the triage line last night and received an answer at that time regarding this question. Patient states she does not need to hear Dr. Reyes answer at this time, and has no concerns.    Does caller want a call/response back: No

## 2024-10-14 ENCOUNTER — ANCILLARY PROCEDURE (OUTPATIENT)
Dept: BONE DENSITY | Facility: CLINIC | Age: 69
End: 2024-10-14
Attending: FAMILY MEDICINE
Payer: COMMERCIAL

## 2024-10-14 DIAGNOSIS — M85.80 OSTEOPENIA, UNSPECIFIED LOCATION: ICD-10-CM

## 2024-10-14 DIAGNOSIS — Z78.0 ASYMPTOMATIC POSTMENOPAUSAL STATUS: ICD-10-CM

## 2024-10-14 PROCEDURE — 77091 TBS TECHL CALCULATION ONLY: CPT | Performed by: PHYSICIAN ASSISTANT

## 2024-10-14 PROCEDURE — 77080 DXA BONE DENSITY AXIAL: CPT | Mod: TC | Performed by: PHYSICIAN ASSISTANT

## 2024-11-08 ENCOUNTER — E-VISIT (OUTPATIENT)
Dept: URGENT CARE | Facility: CLINIC | Age: 69
End: 2024-11-08
Payer: COMMERCIAL

## 2024-11-08 DIAGNOSIS — N39.0 ACUTE UTI (URINARY TRACT INFECTION): Primary | ICD-10-CM

## 2024-11-08 PROCEDURE — 99421 OL DIG E/M SVC 5-10 MIN: CPT | Performed by: PHYSICIAN ASSISTANT

## 2024-11-08 RX ORDER — SULFAMETHOXAZOLE AND TRIMETHOPRIM 800; 160 MG/1; MG/1
1 TABLET ORAL 2 TIMES DAILY
Qty: 6 TABLET | Refills: 0 | Status: SHIPPED | OUTPATIENT
Start: 2024-11-08 | End: 2024-11-11

## 2024-11-08 NOTE — PATIENT INSTRUCTIONS
Dear Kaitlynn Ontiveros    After reviewing your responses, I've been able to diagnose you with a urinary tract infection, which is a common infection of the bladder with bacteria.  This is not a sexually transmitted infection, though urinating immediately after intercourse can help prevent infections.  Drinking lots of fluids is also helpful to clear your current infection and prevent the next one.      I have sent a prescription for antibiotics to your pharmacy to treat this infection.    It is important that you take all of your prescribed medication even if your symptoms are improving after a few doses.  Taking all of your medicine helps prevent the symptoms from returning.     If your symptoms worsen, you develop pain in your back or stomach, develop fevers, or are not improving in 5 days, please contact your primary care provider for an appointment or visit any of our convenient Walk-in or Urgent Care Centers to be seen, which can be found on our website here.    Thanks again for choosing us as your health care partner,    Mimi Guerra PA-C

## 2024-12-18 ENCOUNTER — OFFICE VISIT (OUTPATIENT)
Dept: FAMILY MEDICINE | Facility: CLINIC | Age: 69
End: 2024-12-18
Payer: COMMERCIAL

## 2024-12-18 VITALS
TEMPERATURE: 98.4 F | WEIGHT: 177.2 LBS | DIASTOLIC BLOOD PRESSURE: 76 MMHG | OXYGEN SATURATION: 98 % | HEART RATE: 70 BPM | RESPIRATION RATE: 18 BRPM | BODY MASS INDEX: 27.81 KG/M2 | HEIGHT: 67 IN | SYSTOLIC BLOOD PRESSURE: 119 MMHG

## 2024-12-18 DIAGNOSIS — Z71.84 TRAVEL ADVICE ENCOUNTER: Primary | ICD-10-CM

## 2024-12-18 DIAGNOSIS — Z87.898 H/O MOTION SICKNESS: ICD-10-CM

## 2024-12-18 DIAGNOSIS — Z29.89 ALTITUDE SICKNESS PREVENTATIVE MEASURES: ICD-10-CM

## 2024-12-18 PROCEDURE — 99403 PREV MED CNSL INDIV APPRX 45: CPT | Mod: GA | Performed by: NURSE PRACTITIONER

## 2024-12-18 RX ORDER — ONDANSETRON 4 MG/1
4 TABLET, ORALLY DISINTEGRATING ORAL EVERY 8 HOURS PRN
Qty: 10 TABLET | Refills: 0 | Status: SHIPPED | OUTPATIENT
Start: 2024-12-18

## 2024-12-18 RX ORDER — ACETAZOLAMIDE 125 MG/1
TABLET ORAL
Qty: 8 TABLET | Refills: 0 | Status: SHIPPED | OUTPATIENT
Start: 2024-12-18

## 2024-12-18 RX ORDER — ATOVAQUONE AND PROGUANIL HYDROCHLORIDE 250; 100 MG/1; MG/1
1 TABLET, FILM COATED ORAL DAILY
Qty: 15 TABLET | Refills: 0 | Status: SHIPPED | OUTPATIENT
Start: 2024-12-18

## 2024-12-18 RX ORDER — AZITHROMYCIN 500 MG/1
500 TABLET, FILM COATED ORAL DAILY
Qty: 3 TABLET | Refills: 0 | Status: SHIPPED | OUTPATIENT
Start: 2024-12-18 | End: 2024-12-21

## 2024-12-18 ASSESSMENT — PAIN SCALES - GENERAL: PAINLEVEL_OUTOF10: NO PAIN (0)

## 2024-12-18 NOTE — PATIENT INSTRUCTIONS
Thank you for visiting the [facility] : [phone]  Today December 18, 2024 you received the    Typhoid - Oral. This prescription has been faxed to your pharmacy, please take as directed.     Future possibly   Yellow Fever     Follow up vaccine appointments can be made as a NURSE ONLY visit at the Travel Clinic, (BE PREPARED TO WAIT, ) or at designated [place] [facility].    If you are receiving the Rabies vaccines series, it is important that you follow the exact schedule ordered.     Pre-travel     We recommend that you purchase Medical Evacuation Insurance prior to your departure.  Https://wwwnc.cdc.gov/travel/page/insurance    [place] your travel plans with the  Department of [place] through STEP ( Smart Traveler Enrollment Program ) https://step.state.gov.  STEP is a free service to allow U[place] citizens and nationals traveling and living abroad to enroll their trip with the nearest U.[place]. Embassy or Consulate.    Animal Exposure: Avoid all mammals even if they look healthy.  If there is a bite, scratch or even a lick, wash area immediately with soap and water for 15 minutes and seek medical care within 24 hours for evaluation of Rabies post exposure treatment.  Contact your Medical Evacuation Insurance.    Repiratory illness prevention strategies ( Covid and Influenza ) CDC recommendations:  Consider wearing a mask in crowded or poorly ventilated indoor areas, including on public transportation and in transportation hubs.  Hand washing: frequent, thorough handwashing with soap and water for 20 seconds. Use an alcohol-based hand  with at least 60% alcohol if soap and water are not readily available. Avoid touching face, nose, eyes, mouth unless you have done appropriate hand washing as above.  VACCINES: Staying up to date on COVID-19 vaccines significantly lowers the risk of getting very sick, being hospitalized, or dying from COVID-19. CDC recommends that  everyone stay up to date on their COVID-19 vaccines, especially people with weakened immune systems.    Travel Covid 19 Testing:  updated 12/06/2021  International travelers: Pre-travel:  See country specific Embassy websites or airline websites.    Post travel: CDC recommends getting tested 3-5 days after your trip     Post-travel illness:  Contact your provider or [facility] if you develop a fever, rash, cough, diarrhea or other symptoms for up to 1 year after travel.  Inform your healthcare provider when and where you traveled to.    Please call the [facility] with any questions [phone]  Or send your provider a 'My Chart' note.

## 2024-12-18 NOTE — PROGRESS NOTES
"Nurse Note ( Pre-Travel Consult)    Itinerary:  [place]    Departure Date: 1/23/25    Return Date: 2/8/25    Length of Trip 2 weeks    Reason for Travel: Tourism         Urban or rural: both    Accommodations: Hotel        IMMUNIZATION HISTORY  Have you received any immunizations within the past 4 weeks?  Yes - covid, flu  Have you ever fainted from having your blood drawn or from an injection?  No  Have you ever had a fever reaction to vaccination?  No  Have you ever had any bad reaction or side effect from any vaccination?  No  Have you ever had hepatitis A or B vaccine?  Yes  Do you live (or work closely) with anyone who has AIDS, an AIDS-like condition, any other immune disorder or who is on chemotherapy for cancer?  No  Do you have a family history of immunodeficiency?  No  Have you received any injection of immune globulin or any blood products during the past 12 months?  No    Patient roomed by Dina Tinsley RN      Subjective  Kaitlynn Ontiveros is a 69 year old female seen today with spouse for counsultation for international travel.   Patient will be departing in  1 month(s) and  traveling with spouse.      Patient itinerary :  will be in the urban  region of [facility] > [place] [facility] ( live aboard) > [facility] for 2 days > [place] for 4 days >transit [facility] then out through [facility]  which risk for Malaria, Yellow Fever, food borne illnesses, motor vehicle accidents, and Typhoid. exposure.      Patient's activities will include sightseeing, cruise, and snorkeling.    Patient's country of birth is [place]    Special medical concerns: Motion sickness  Pre-travel questionnaire was completed by patient and reviewed by provider.     Vitals: /76   Pulse 70   Temp 98.4  F (36.9  C) (Temporal)   Resp 18   Ht 1.689 m (5' 6.5\")   Wt 80.4 kg (177 lb 3.2 oz)   SpO2 98%   BMI 28.17 kg/m    BMI= Body mass index is 28.17 kg/m .    EXAM:  General:  Well-nourished, well-developed in no acute distress.  Appears to be " stated age, interacts appropriately and expresses understanding of information given to patient.    Current Outpatient Medications   Medication Sig Dispense Refill    cholecalciferol, vitamin D3, (VITAMIN D3) 1,000 unit capsule [CHOLECALCIFEROL, VITAMIN D3, (VITAMIN D3) 1,000 UNIT CAPSULE] Take 1,000 Units by mouth daily.      COVID-19 mRNA vacc, Moderna, 100 MCG/0.5ML injection       LACTOBACILLUS ACIDOPHILUS (PROBIOTIC ORAL) [LACTOBACILLUS ACIDOPHILUS (PROBIOTIC ORAL)] Take 1 capsule by mouth daily.       magnesium 30 mg tablet [MAGNESIUM 30 MG TABLET] Take 30 mg by mouth 2 (two) times a day.      multivitamin therapeutic (THERAGRAN) tablet [MULTIVITAMIN THERAPEUTIC (THERAGRAN) TABLET] Take 1 tablet by mouth daily.      niacin 500 MG tablet Take by mouth daily (with breakfast).      ZOLMitriptan (ZOMIG) 5 MG tablet TAKE ONE-HALF (1/2) TABLET AS NEEDED FOR MIGRAINE 36 tablet 0     Patient Active Problem List   Diagnosis    Allergic Rhinitis    Migraine Headache    Insomnia     No Known Allergies      Immunizations discussed include:   Covid 19: Up to date  Hepatitis A:  Up to date  Hepatitis B: Declined  Not concerned about risk of disease  Influenza: Up to date  Typhoid: Oral Typhoid (Vivotiff) Rx sent to pharmacy  Rabies: Declined  reviewed managment of a animal bite or scratch (washing wound, seek medical care within 24 hours for post exposure prophylaxis )  Yellow Fever:declined for today future order placed  Tamazight Encephalitis: Not indicated  Meningococcus: Not indicated  Tetanus/Diphtheria: Up to date  Measles/Mumps/Rubella: Immune by disease history per patient report  Cholera: Not needed  Polio: Not indicated  Pneumococcal: Up to date  Varicella: Immune by disease history per patient report  Shingrix: Up to date  HPV:  Not indicated     TB: low risk     Altitude Exposure on this trip: yes Luz  Past tolerance to Altitude: Diamox prescription given with instructions on use and education provided on Acute  altitude illness recognition and prevention.  Zofran : for nausea      ASSESSMENT/PLAN:  Kaitlynn was seen today for travel clinic.    Diagnoses and all orders for this visit:    Travel advice encounter  -     typhoid (VIVOTIF) CR capsule; Take 1 capsule by mouth every other day.  -     azithromycin (ZITHROMAX) 500 MG tablet; Take 1 tablet (500 mg) by mouth daily for 3 doses. Take 1 tablet a day for up to 3 days for severe diarrhea  -     atovaquone-proguanil (MALARONE) 250-100 MG tablet; Take 1 tablet by mouth daily. Start 2 days before exposure to Malaria and continue daily till  7 days after exposure.  -     acetaZOLAMIDE (DIAMOX) 125 MG tablet; Take one tab every 12 hours starting 24 hours prior to Luz and continue for 24 hours while at the highest altitude    H/O motion sickness  -     ondansetron (ZOFRAN ODT) 4 MG ODT tab; Take 1 tablet (4 mg) by mouth every 8 hours as needed for nausea or vomiting.    Altitude sickness preventative measures  -     acetaZOLAMIDE (DIAMOX) 125 MG tablet; Take one tab every 12 hours starting 24 hours prior to Luz and continue for 24 hours while at the highest altitude    Other orders  -     YELLOW FEVER, LIVE SQ; Standing      I have reviewed general recommendations for safe travel   including: food/water precautions, insect precautions, roadway safety. Educational materials and [employer] report provided.    Malaraia prophylaxis recommended: Malarone  Symptomatic treatment for traveler's diarrhea: azithromycin  Altitude illness prevention and treatment: Diamox prescription given with instructions on use and education provided on Acute altitude illness recognition and prevention.   Zofran for Nausea      Evacuation insurance advised and resources were provided to patient.    Total visit time 43 minutes  with over 50% of time spent counseling patient and shared decision making as detailed above.    Sharon Dozier CNP  Certificate in Travel Health

## 2024-12-23 ENCOUNTER — TRANSFERRED RECORDS (OUTPATIENT)
Dept: HEALTH INFORMATION MANAGEMENT | Facility: CLINIC | Age: 69
End: 2024-12-23
Payer: COMMERCIAL

## 2024-12-27 ENCOUNTER — ANCILLARY PROCEDURE (OUTPATIENT)
Dept: MAMMOGRAPHY | Facility: HOSPITAL | Age: 69
End: 2024-12-27
Attending: FAMILY MEDICINE
Payer: COMMERCIAL

## 2024-12-27 DIAGNOSIS — Z12.31 VISIT FOR SCREENING MAMMOGRAM: ICD-10-CM

## 2024-12-27 PROCEDURE — 77063 BREAST TOMOSYNTHESIS BI: CPT

## 2024-12-27 PROCEDURE — 77067 SCR MAMMO BI INCL CAD: CPT

## 2024-12-31 ENCOUNTER — ALLIED HEALTH/NURSE VISIT (OUTPATIENT)
Dept: FAMILY MEDICINE | Facility: CLINIC | Age: 69
End: 2024-12-31
Payer: COMMERCIAL

## 2024-12-31 DIAGNOSIS — Z23 ENCOUNTER FOR IMMUNIZATION: Primary | ICD-10-CM

## 2024-12-31 DIAGNOSIS — Z23 NEED FOR PROPHYLACTIC VACCINATION AGAINST HEPATITIS B VIRUS: ICD-10-CM

## 2024-12-31 PROCEDURE — G0010 ADMIN HEPATITIS B VACCINE: HCPCS

## 2024-12-31 PROCEDURE — 90746 HEPB VACCINE 3 DOSE ADULT IM: CPT

## 2024-12-31 PROCEDURE — 99207 PR NO CHARGE NURSE ONLY: CPT

## 2024-12-31 NOTE — PROGRESS NOTES
Prior to immunization administration, verified patients identity using patient s name and date of birth. Please see Immunization Activity for additional information.     Is the patient's temperature normal (100.5 or less)? Yes     Patient MEETS CRITERIA. PROCEED with vaccine administration.      Patient instructed to remain in clinic for 15 minutes afterwards, and to report any adverse reactions.      Link to Ancillary Visit Immunization Standing Orders SmartSet     Screening performed by Magdalena Walker CMA on 12/31/2024 at 9:46 AM.

## 2025-01-16 ENCOUNTER — PATIENT OUTREACH (OUTPATIENT)
Dept: CARE COORDINATION | Facility: CLINIC | Age: 70
End: 2025-01-16
Payer: COMMERCIAL

## 2025-01-30 ENCOUNTER — PATIENT OUTREACH (OUTPATIENT)
Dept: CARE COORDINATION | Facility: CLINIC | Age: 70
End: 2025-01-30
Payer: COMMERCIAL

## 2025-02-10 ENCOUNTER — ALLIED HEALTH/NURSE VISIT (OUTPATIENT)
Dept: FAMILY MEDICINE | Facility: CLINIC | Age: 70
End: 2025-02-10
Payer: COMMERCIAL

## 2025-02-10 DIAGNOSIS — Z23 ENCOUNTER FOR IMMUNIZATION: ICD-10-CM

## 2025-02-10 DIAGNOSIS — Z23 NEED FOR PROPHYLACTIC VACCINATION AGAINST HEPATITIS B VIRUS: Primary | ICD-10-CM

## 2025-02-10 PROCEDURE — 90746 HEPB VACCINE 3 DOSE ADULT IM: CPT

## 2025-02-10 PROCEDURE — 99207 PR NO CHARGE NURSE ONLY: CPT

## 2025-02-10 PROCEDURE — G0010 ADMIN HEPATITIS B VACCINE: HCPCS

## 2025-02-10 NOTE — PROGRESS NOTES
Prior to immunization administration, verified patients identity using patient s name and date of birth. Please see Immunization Activity for additional information.     Is the patient's temperature normal (100.5 or less)? Yes     Patient MEETS CRITERIA. PROCEED with vaccine administration.      Patient instructed to remain in clinic for 15 minutes afterwards, and to report any adverse reactions.      Link to Ancillary Visit Immunization Standing Orders SmartSet     Screening performed by Magdalena Walker CMA on 2/10/2025 at 9:50 AM.

## 2025-03-01 ENCOUNTER — NURSE TRIAGE (OUTPATIENT)
Dept: NURSING | Facility: CLINIC | Age: 70
End: 2025-03-01
Payer: COMMERCIAL

## 2025-03-01 NOTE — TELEPHONE ENCOUNTER
"Nurse Triage SBAR    Is this a 2nd Level Triage? NO    Situation: Intermittent     Background:   Lower abd. Pain intermittent x 2 days, prior 3 days (though indigestion)    Assessment:   -Hx; Diverticulitis  -Pain in lower gut, Lower abd. Pain  -Pain feels similar to Diverticulitis  -Lower Abd. Pain 8/10, intermittent, then does away  -Have tried abd. Massage via internet suggestion  -T, W,Th. Taking Tums, resolved in first 3 days  -Yesterday Temperature 99.6  -Today, don't feel feverish  -Flu like symptoms  - No bowel movement in 2 days (normal: every day 1 or more)      Protocol Recommended Disposition:   See PCP Within 24 Hours    Recommendation: Care advice reviewed. Patient verbalized understanding and agreed to follow care advice given. Advised to call back with any new signs or symptoms, Patient agreed  -Pt. Plans on going to [facility]     Reason for Disposition   [1] MODERATE pain (e.g., interferes with normal activities) AND [2] pain comes and goes (cramps) AND [3] present > 24 hours  (Exception: Pain with Vomiting or Diarrhea - see that Guideline.)    Additional Information   Negative: Shock suspected (e.g., cold/pale/clammy skin, too weak to stand, low BP, rapid pulse)   Negative: Passed out (i.e., lost consciousness, collapsed and was not responding)   Negative: Difficult to awaken or acting confused (e.g., disoriented, slurred speech)   Negative: Sounds like a life-threatening emergency to the triager   Negative: Followed an abdomen (stomach) injury   Negative: Chest pain   Negative: [1] Abdominal pain AND [2] pregnant < 20 weeks   Negative: [1] Abdominal pain AND [2] pregnant 20 or more weeks   Negative: [1] Abdominal pain AND [2] postpartum (from 0 to 6 weeks after delivery)   Negative: Pain is mainly in upper abdomen  (if needed ask: \"is it mainly above the belly button?\")   Negative: Abdomen bloating or swelling are main symptoms   Negative: [1] SEVERE pain (e.g., excruciating) AND [2] present > " "1 hour   Negative: [1] SEVERE pain AND [2] age > 60 years   Negative: [1] Vomiting AND [2] contains red blood or black (\"coffee ground\") material  (Exception: Few red streaks in vomit that only happened once.)   Negative: Blood in bowel movements  (Exception: Blood on surface of BM with constipation.)   Negative: Black or tarry bowel movements  (Exception: Chronic-unchanged black-grey BMs AND is taking iron pills or Pepto-Bismol.)   Negative: [1] Vomiting AND [2] contains bile (green color)   Negative: Patient sounds very sick or weak to the triager   Negative: [1] MILD-MODERATE pain AND [2] constant AND [3] present > 2 hours   Negative: [1] Vomiting AND [2] abdomen looks much more swollen than usual   Negative: White of the eyes have turned yellow (i.e., jaundice)   Negative: Fever > 103 F (39.4 C)   Negative: [1] Fever > 101 F (38.3 C) AND [2] age > 60 years   Negative: [1] Fever > 100.0 F (37.8 C) AND [2] bedridden (e.g., CVA, chronic illness, recovering from surgery)   Negative: [1] Fever > 100.0 F (37.8 C) AND [2] diabetes mellitus or weak immune system (e.g., HIV positive, cancer chemo, splenectomy, organ transplant, chronic steroids)   Negative: [1] SEVERE pain AND [2] present < 1 hour    Protocols used: Abdominal Pain - Female-A-  Siri Chiang RN, Triage Nurse Advisor, 3/1/2025 5:29 PM  "

## 2025-03-16 ENCOUNTER — HEALTH MAINTENANCE LETTER (OUTPATIENT)
Age: 70
End: 2025-03-16

## 2025-05-25 DIAGNOSIS — G43.909 MIGRAINE WITHOUT STATUS MIGRAINOSUS, NOT INTRACTABLE, UNSPECIFIED MIGRAINE TYPE: ICD-10-CM

## 2025-05-27 RX ORDER — ZOLMITRIPTAN 5 MG/1
TABLET, FILM COATED ORAL
Qty: 36 TABLET | Refills: 3 | Status: SHIPPED | OUTPATIENT
Start: 2025-05-27

## 2025-06-02 ENCOUNTER — VIRTUAL VISIT (OUTPATIENT)
Dept: FAMILY MEDICINE | Facility: CLINIC | Age: 70
End: 2025-06-02
Payer: COMMERCIAL

## 2025-06-02 DIAGNOSIS — R19.7 DIARRHEA, UNSPECIFIED TYPE: Primary | ICD-10-CM

## 2025-06-02 PROCEDURE — 98006 SYNCH AUDIO-VIDEO EST MOD 30: CPT | Performed by: PHYSICIAN ASSISTANT

## 2025-06-02 ASSESSMENT — ENCOUNTER SYMPTOMS
FATIGUE: 1
ANAL BLEEDING: 0
FEVER: 0
CHILLS: 0
ACTIVITY CHANGE: 0
DIARRHEA: 1
NAUSEA: 0
BLOOD IN STOOL: 0
CONSTIPATION: 0
UNEXPECTED WEIGHT CHANGE: 0
VOMITING: 0
APPETITE CHANGE: 0
DIZZINESS: 0
LIGHT-HEADEDNESS: 0
SHORTNESS OF BREATH: 0
ABDOMINAL PAIN: 0
PALPITATIONS: 0

## 2025-06-02 NOTE — PATIENT INSTRUCTIONS
For further evaluation of your symptoms, we are completing labs. I will be in touch on MyChart with results and next steps if needed. If your labs are normal, I suspect your symptoms may be related to IBS.  You can use Metamucil to help manage IBS symptoms. See the attached handouts for additional information.

## 2025-06-02 NOTE — PROGRESS NOTES
Kaitlynn is a 69 year old who is being evaluated via a billable video visit.    How would you like to obtain your AVS? MyChart  If the video visit is dropped, the invitation should be resent by: Text to cell phone: [phone]  Will anyone else be joining your video visit? No      Assessment & Plan     Diarrhea, unspecified type  Patient is a 69 YOF who presents to virtual visit due to 1 month of  significant increase in frequency and urgency of stools which have become more loose/diarrhea. Similar symptoms once monthly X 5 years. No abdominal pain, hematochezia, melena, fevers, sweats, or weight loss to suggest infection, GI bleed, malignancy. Prior colonoscopy reviewed from 2024 showing diverticulosis. Repeat in 10 years recommended. Ddx includes, but is not limited to, IBS, IBD, celiac, bacterial diarrhea. Will complete labs as below for further evaluation. In case of IBS healthy diet changes and OTC fiber supplement recommended. Educational handout and urgent follow up precautions provided.   - CBC with platelets; Future  - Basic metabolic panel  (Ca, Cl, CO2, Creat, Gluc, K, Na, BUN); Future  - ESR: Erythrocyte sedimentation rate; Future  - CRP, inflammation; Future  - C. difficile Toxin B PCR with reflex to C. difficile EIA; Future  - Tissue transglutaminase slim IgA and IgG; Future    Patient also requesting to switch chronic migraine therapy, but as she has been working with PCP on this, patient plans to follow up with PCP for further medication management.     Subjective   Kaitlynn is a 69 year old, presenting for the following health issues:  Bowel Problems (Urgency, oatmeal consistency, daily 4-6x ) and migraines        6/2/2025     9:42 AM   Additional Questions   Roomed by Kip Lomas CMA   Accompanied by N/A         6/2/2025     9:42 AM   Patient Reported Additional Medications   Patient reports taking the following new medications No new medications.     Video Start Time: 10:02 AM    History of Present  Illness       Reason for visit:  Bowel concerns, migraines, epilepsy medication possiblity  Symptom onset:  More than a month  Symptoms include:  Bowel issues, migraines  Symptom intensity:  Moderate  Symptom progression:  Worsening  Had these symptoms before:  No  What makes it worse:  NA  What makes it better:  NA   She is taking medications regularly.      Patient notes that she now has the urge to have BM and will have to race to the bathroom. Similar symptoms for the last 5 years, but they would occur once per month. For the last month they are happening multiple times per day and are worsening. Patient has had 4 episodes today already with loose stools and large quantity. Gas is also more severe. Niece has Chron's disease. No family history of celiac.     Migraines for 60 years. Family history of migraines. Brother is on epilepsy medications for migraines. Patient is on Zomig, but feels this is not working for her and she is interested in switching, but has been managing this with primary care provider and wonders if she should follow up with PCP. Migraines occur every 3 weeks and last for about 3 days.     Review of Systems   Constitutional:  Positive for fatigue. Negative for activity change, appetite change, chills, fever and unexpected weight change.   Respiratory:  Negative for shortness of breath.    Cardiovascular:  Negative for chest pain and palpitations.   Gastrointestinal:  Positive for diarrhea. Negative for abdominal pain, anal bleeding, blood in stool, constipation, nausea and vomiting.   Neurological:  Negative for dizziness and light-headedness.           Objective           Vitals:  No vitals were obtained today due to virtual visit.    Physical Exam   GENERAL: alert and no distress  EYES: Eyes grossly normal to inspection.  No discharge or erythema, or obvious scleral/conjunctival abnormalities.  RESP: No audible wheeze, cough, or visible cyanosis.    SKIN: Visible skin clear. No significant  rash, abnormal pigmentation or lesions.  NEURO: Cranial nerves grossly intact.  Mentation and speech appropriate for age.  PSYCH: Appropriate affect, tone, and pace of words        Video-Visit Details    Type of service:  Video Visit   Video End Time:10:22 AM  Originating Location (pt. Location): Home    Distant Location (provider location):  Off-site  Platform used for Video Visit: Svetlana  Signed Electronically by: Crystal Henderson PA-C

## 2025-06-03 ENCOUNTER — LAB (OUTPATIENT)
Dept: LAB | Facility: CLINIC | Age: 70
End: 2025-06-03
Payer: COMMERCIAL

## 2025-06-03 ENCOUNTER — RESULTS FOLLOW-UP (OUTPATIENT)
Dept: FAMILY MEDICINE | Facility: CLINIC | Age: 70
End: 2025-06-03

## 2025-06-03 DIAGNOSIS — R19.7 DIARRHEA, UNSPECIFIED TYPE: ICD-10-CM

## 2025-06-03 LAB
ANION GAP SERPL CALCULATED.3IONS-SCNC: 11 MMOL/L (ref 7–15)
BUN SERPL-MCNC: 17.5 MG/DL (ref 8–23)
C DIFF TOX B STL QL: NEGATIVE
CALCIUM SERPL-MCNC: 9.4 MG/DL (ref 8.8–10.4)
CHLORIDE SERPL-SCNC: 106 MMOL/L (ref 98–107)
CREAT SERPL-MCNC: 0.86 MG/DL (ref 0.51–0.95)
CRP SERPL-MCNC: <3 MG/L
EGFRCR SERPLBLD CKD-EPI 2021: 73 ML/MIN/1.73M2
ERYTHROCYTE [DISTWIDTH] IN BLOOD BY AUTOMATED COUNT: 13.4 % (ref 10–15)
ERYTHROCYTE [SEDIMENTATION RATE] IN BLOOD BY WESTERGREN METHOD: 9 MM/HR (ref 0–30)
GLUCOSE SERPL-MCNC: 88 MG/DL (ref 70–99)
HCO3 SERPL-SCNC: 25 MMOL/L (ref 22–29)
HCT VFR BLD AUTO: 47.4 % (ref 35–47)
HGB BLD-MCNC: 15.2 G/DL (ref 11.7–15.7)
MCH RBC QN AUTO: 32.4 PG (ref 26.5–33)
MCHC RBC AUTO-ENTMCNC: 32.1 G/DL (ref 31.5–36.5)
MCV RBC AUTO: 101 FL (ref 78–100)
PLATELET # BLD AUTO: 196 10E3/UL (ref 150–450)
POTASSIUM SERPL-SCNC: 4.2 MMOL/L (ref 3.4–5.3)
RBC # BLD AUTO: 4.69 10E6/UL (ref 3.8–5.2)
SODIUM SERPL-SCNC: 142 MMOL/L (ref 135–145)
WBC # BLD AUTO: 5.3 10E3/UL (ref 4–11)

## 2025-06-03 PROCEDURE — 85652 RBC SED RATE AUTOMATED: CPT

## 2025-06-03 PROCEDURE — 87493 C DIFF AMPLIFIED PROBE: CPT

## 2025-06-03 PROCEDURE — 86140 C-REACTIVE PROTEIN: CPT

## 2025-06-03 PROCEDURE — 36415 COLL VENOUS BLD VENIPUNCTURE: CPT

## 2025-06-03 PROCEDURE — 80048 BASIC METABOLIC PNL TOTAL CA: CPT

## 2025-06-03 PROCEDURE — 85027 COMPLETE CBC AUTOMATED: CPT

## 2025-06-04 ENCOUNTER — LAB (OUTPATIENT)
Dept: LAB | Facility: CLINIC | Age: 70
End: 2025-06-04
Payer: COMMERCIAL

## 2025-06-04 ENCOUNTER — VIRTUAL VISIT (OUTPATIENT)
Dept: FAMILY MEDICINE | Facility: CLINIC | Age: 70
End: 2025-06-04
Payer: COMMERCIAL

## 2025-06-04 DIAGNOSIS — R19.7 DIARRHEA, UNSPECIFIED TYPE: ICD-10-CM

## 2025-06-04 DIAGNOSIS — R19.7 DIARRHEA, UNSPECIFIED TYPE: Primary | ICD-10-CM

## 2025-06-04 DIAGNOSIS — G43.909 MIGRAINE WITHOUT STATUS MIGRAINOSUS, NOT INTRACTABLE, UNSPECIFIED MIGRAINE TYPE: ICD-10-CM

## 2025-06-04 PROCEDURE — 98006 SYNCH AUDIO-VIDEO EST MOD 30: CPT | Performed by: FAMILY MEDICINE

## 2025-06-04 PROCEDURE — 87507 IADNA-DNA/RNA PROBE TQ 12-25: CPT | Mod: GZ

## 2025-06-04 RX ORDER — TOPIRAMATE 25 MG/1
TABLET, FILM COATED ORAL
Qty: 120 TABLET | Refills: 1 | Status: SHIPPED | OUTPATIENT
Start: 2025-06-04

## 2025-06-04 RX ORDER — ZOLMITRIPTAN 5 MG/1
5 TABLET, FILM COATED ORAL
Qty: 36 TABLET | Refills: 3 | Status: SHIPPED | OUTPATIENT
Start: 2025-06-04

## 2025-06-04 ASSESSMENT — ENCOUNTER SYMPTOMS: DIARRHEA: 1

## 2025-06-04 NOTE — TELEPHONE ENCOUNTER
Antonella from lab call and spoke with patient, patient will be calling her insurance to check on coverage.

## 2025-06-04 NOTE — TELEPHONE ENCOUNTER
Please call patient.  I completed a video visit with her this morning and we discussed completing a stool test to check for bacteria and viruses that can lead to diarrhea.  When I attempted place the order, it states that [employer] may not cover cost of this test, which could cost $500-$1200 out-of-pocket.  The CPT code of the test [place] 57[address] --I recommend that patient calls her insurance company to see if this will be covered prior to obtaining the sample.  Please let me know if she will be performing this test and I will place the order.  For now, I will hold off on placing the order.  VJ

## 2025-06-04 NOTE — PROGRESS NOTES
Kaitlynn is a 69 year old who is being evaluated via a billable video visit.          Assessment & Plan     Migraine without status migrainosus, not intractable, unspecified migraine type  Inadequate control based on severity of migraines that occur along with duration.  Inadequate effect with migraine X supplement.  Brother responding well to topiramate.  Concerned about potential for weight gain with propranolol along with tendency towards lower blood pressure.  Will initiate topiramate 25 mg at bedtime, increasing to twice daily after a week, increasing to 50 mg twice daily after another week.  Notify me sooner if side effects.  Monitor and manage triggers including red wine, chocolate, stress, poor sleep, MSG, and note that weather can trigger as well though we have no control over that.  - topiramate (TOPAMAX) 25 MG tablet; 1 tab by mouth daily at bedtime for 1 week, then twice daily for 1 week, then 2 tabs by mouth twice daily.  - ZOLMitriptan (ZOMIG) 5 MG tablet; Take 1 tablet (5 mg) by mouth at onset of headache for migraine. May repeat dose in 2 hours as needed, max 10 mg in 24 hours.    Diarrhea, unspecified type  Given significant travel, I think it is reasonable to perform additional stool studies with enteric bacterial and viral panel and with ova and parasites exam.  Orders placed for these.  Following patient visit, it was noted that there is potential that the enteric bacterial viral panel may not be well covered by her insurance.  Staff member was able to call patient to review this with her, she states understanding and is given information to contact her insurance covered to determine potential cost prior to completion.  Interim, I recommend continued probiotics.  Advised lactose-free diet in the short-term.  If evaluation is unremarkable, would give consideration to gastroenterology consultation.  - Enteric Bacteria and Virus Panel by [facility] St[facility]; Future  - Ova and Parasite Exam Routine;  "Future          BMI  Estimated body mass index is 26.79 kg/m  as calculated from the following:    Height as of 3/14/25: 1.676 m (5' 6\").    Weight as of 3/14/25: 75.3 kg (166 lb).             Subjective   Kaitlynn is a 69 year old, presenting for the following health issues:  migraines (Had a conversation with Dr Oneal about a year ago about trying a epilepsy medication for her migraines,  it was suggested she try Migrainex, this is not helping. She wants to discuss again about a epilepsy medication) and Diarrhea (6-6-25 had virtual visit. Received results from stool samples saying all result were normal, her question is now what?)      Video Start Time: 9:06    Seen today by video visit to discuss migraines and diarrhea.  Tendency to have migraines about every 3 weeks, they typically last 3 days.  Zolmitriptan will resolve the headache but usually returns every 12 hours for a full 3 days.  Very debilitating when it occurs.  She denies aura.  She does get some nausea.  Notes her brother and many family members have had significant migraines.  Her brother has responded well to Topamax.  Patient is already read the book \"Heal Your Headache\" and has found it helpful and is initiating some of the measures recommended.  Notes that red wine, chocolate, stress, poor sleep, weather changes, and MSG all are triggers for her migraines.    Seen at urgent care for significant diarrhea associated with significant urgency.  It is more loose stools and oatmeal in texture.  Multiple times per day.  Current significant flare but has been present off and on over the last year or so.  Taking Metamucil, probiotic.  She had a normal colonoscopy 1 year ago.  Has traveled to [facility] in the [place] in January and February, believes the symptoms may have preceded the trip but uncertain.  No blood or mucus in stools.  Recent evaluation notable for negative C. difficile, labs otherwise unremarkable though tissue transglutaminase " antibodies are pending.    Diarrhea    History of Present Illness       Reason for visit:  Bowel concerns, migraines, epilepsy medication possiblity  Symptom onset:  More than a month  Symptoms include:  Bowel issues, migraines  Symptom intensity:  Moderate  Symptom progression:  Worsening  Had these symptoms before:  No  What makes it worse:  NA  What makes it better:  NA   She is taking medications regularly.                    Objective           Vitals:  No vitals were obtained today due to virtual visit.    Physical Exam   GENERAL: alert and no distress  EYES: Eyes grossly normal to inspection.  No discharge or erythema, or obvious scleral/conjunctival abnormalities.  RESP: No audible wheeze, cough, or visible cyanosis.    SKIN: Visible skin clear. No significant rash, abnormal pigmentation or lesions.  NEURO: Cranial nerves grossly intact.  Mentation and speech appropriate for age.  PSYCH: Appropriate affect, tone, and pace of words    Lab on 06/03/2025   Component Date Value Ref Range Status    WBC Count 06/03/2025 5.3  4.0 - 11.0 10e3/uL Final    RBC Count 06/03/2025 4.69  3.80 - 5.20 10e6/uL Final    Hemoglobin 06/03/2025 15.2  11.7 - 15.7 g/dL Final    Hematocrit 06/03/2025 47.4 (H)  35.0 - 47.0 % Final    MCV 06/03/2025 101 (H)  78 - 100 fL Final    MCH 06/03/2025 32.4  26.5 - 33.0 pg Final    MCHC 06/03/2025 32.1  31.5 - 36.5 g/dL Final    RDW 06/03/2025 13.4  10.0 - 15.0 % Final    Platelet Count 06/03/2025 196  150 - 450 10e3/uL Final    Sodium 06/03/2025 142  135 - 145 mmol/L Final    Potassium 06/03/2025 4.2  3.4 - 5.3 mmol/L Final    Chloride 06/03/2025 106  98 - 107 mmol/L Final    Carbon Dioxide (CO2) 06/03/2025 25  22 - 29 mmol/L Final    Anion Gap 06/03/2025 11  7 - 15 mmol/L Final    Urea Nitrogen 06/03/2025 17.5  8.0 - 23.0 mg/dL Final    Creatinine 06/03/2025 0.86  0.51 - 0.95 mg/dL Final    GFR Estimate 06/03/2025 73  >60 mL/min/1.73m2 Final    eGFR calculated using 2021 CKD-EPI equation.     Calcium 06/03/2025 9.4  8.8 - 10.4 mg/dL Final    Glucose 06/03/2025 88  70 - 99 mg/dL Final    Erythrocyte Sedimentation Rate 06/03/2025 9  0 - 30 mm/hr Final    CRP Inflammation 06/03/2025 <3.00  <5.00 mg/L Final    C Difficile Toxin B by PCR 06/03/2025 Negative  Negative Final    A negative result does not exclude actual disease due to C. difficile and may be due to improper collection, handling and storage of the specimen or the number of organisms in the specimen is below the detection limit of the assay.         Video-Visit Details    Type of service:  Video Visit   Video End Time:9:28  Originating Location (pt. Location): Home    Distant Location (provider location):  On-site  Platform used for Video Visit: Svetlana  Signed Electronically by: Zeinab Oneal MD

## 2025-06-05 LAB
ADV 40+41 DNA STL QL NAA+NON-PROBE: NEGATIVE
ASTRO TYP 1-8 RNA STL QL NAA+NON-PROBE: NEGATIVE
C CAYETANENSIS DNA STL QL NAA+NON-PROBE: NEGATIVE
CAMPYLOBACTER DNA SPEC NAA+PROBE: NEGATIVE
CRYPTOSP DNA STL QL NAA+NON-PROBE: NEGATIVE
E COLI O157 DNA STL QL NAA+NON-PROBE: NORMAL
E HISTOLYT DNA STL QL NAA+NON-PROBE: NEGATIVE
EAEC ASTA GENE ISLT QL NAA+PROBE: NEGATIVE
EC STX1+STX2 GENES STL QL NAA+NON-PROBE: NEGATIVE
EPEC EAE GENE STL QL NAA+NON-PROBE: NEGATIVE
ETEC LTA+ST1A+ST1B TOX ST NAA+NON-PROBE: NEGATIVE
G LAMBLIA DNA STL QL NAA+NON-PROBE: NEGATIVE
NOROVIRUS GI+II RNA STL QL NAA+NON-PROBE: NEGATIVE
P SHIGELLOIDES DNA STL QL NAA+NON-PROBE: NEGATIVE
RVA RNA STL QL NAA+NON-PROBE: NEGATIVE
SALMONELLA SP RPOD STL QL NAA+PROBE: NEGATIVE
SAPO I+II+IV+V RNA STL QL NAA+NON-PROBE: NEGATIVE
SHIGELLA SP+EIEC IPAH ST NAA+NON-PROBE: NEGATIVE
V CHOLERAE DNA SPEC QL NAA+PROBE: NEGATIVE
VIBRIO DNA SPEC NAA+PROBE: NEGATIVE
Y ENTEROCOL DNA STL QL NAA+PROBE: NEGATIVE

## 2025-06-09 ENCOUNTER — RESULTS FOLLOW-UP (OUTPATIENT)
Dept: FAMILY MEDICINE | Facility: CLINIC | Age: 70
End: 2025-06-09

## 2025-08-10 ENCOUNTER — MYC MEDICAL ADVICE (OUTPATIENT)
Dept: FAMILY MEDICINE | Facility: CLINIC | Age: 70
End: 2025-08-10
Payer: COMMERCIAL

## 2025-08-10 DIAGNOSIS — R19.7 DIARRHEA, UNSPECIFIED TYPE: Primary | ICD-10-CM

## 2025-08-11 ENCOUNTER — ALLIED HEALTH/NURSE VISIT (OUTPATIENT)
Dept: FAMILY MEDICINE | Facility: CLINIC | Age: 70
End: 2025-08-11
Payer: COMMERCIAL

## 2025-08-11 DIAGNOSIS — Z23 ENCOUNTER FOR IMMUNIZATION: Primary | ICD-10-CM

## 2025-08-11 PROCEDURE — 99207 PR NO CHARGE NURSE ONLY: CPT

## 2025-08-11 PROCEDURE — G0010 ADMIN HEPATITIS B VACCINE: HCPCS

## 2025-08-11 PROCEDURE — 90746 HEPB VACCINE 3 DOSE ADULT IM: CPT

## 2025-08-14 ENCOUNTER — PATIENT OUTREACH (OUTPATIENT)
Dept: CARE COORDINATION | Facility: CLINIC | Age: 70
End: 2025-08-14
Payer: COMMERCIAL

## 2025-08-29 ENCOUNTER — TELEPHONE (OUTPATIENT)
Dept: FAMILY MEDICINE | Facility: CLINIC | Age: 70
End: 2025-08-29
Payer: COMMERCIAL